# Patient Record
Sex: MALE | Race: WHITE | NOT HISPANIC OR LATINO | Employment: UNEMPLOYED | URBAN - METROPOLITAN AREA
[De-identification: names, ages, dates, MRNs, and addresses within clinical notes are randomized per-mention and may not be internally consistent; named-entity substitution may affect disease eponyms.]

---

## 2020-03-04 ENCOUNTER — HOSPITAL ENCOUNTER (OUTPATIENT)
Dept: MEDSURG UNIT | Facility: HOSPITAL | Age: 35
End: 2020-03-06
Attending: INTERNAL MEDICINE | Admitting: INTERNAL MEDICINE

## 2020-03-10 LAB
FINAL CULTURE: NORMAL
FINAL CULTURE: NORMAL

## 2022-04-29 NOTE — ED PROVIDER NOTES
Patient:   Michael Pickard             MRN: [id number]            FIN: [id number]               Age:   34 years     Sex:  Male     :  1985   Associated Diagnoses:   Hypomagnesemia; Hypokalemia; Methamphetamine abuse   Author:   Rodrick Masters MD      Basic Information   Time seen: Date & time 3/4/2020 20:30:00.   History source: Patient.   Arrival mode: Private vehicle.   History limitation: None.   Additional information: Chief Complaint from Nursing Triage Note : Chief Complaint   3/4/2020 20:25 CST       Chief Complaint           CHEST PAIN FOR  ABOUT  1  WEEK.  PATIENT  SAYS  HE  MIHGT HAVE  KIDNEY STONES.  ALSO C/O SOB. DOES  SMOKE  MARJUANA  AND  DOES  METHAMPHETAMINES.  SAYS  LAST  DID  METH  WAS  2  DAYS  AGO  .   Provider/Visit info:   Time Seen:  Rodrick Masters MD / 2020 20:30  .   History of Present Illness   The patient presents with chest pain.  The onset was 1  weeks ago.  The course/duration of symptoms is constant.  Location: substernal. Radiating pain: none. The character of symptoms is tightness and pressure.  The degree at onset was moderate.  The degree at maximum was severe.  The degree at present is moderate.  The exacerbating factor is none.  The relieving factor is none.  Risk factors consist of methamphetamine abuse.  Associated symptoms: shortness of breath, anxiety and palpitations.      34-year-old gentleman with a history of methamphetamine abuse presents emergency room with complaints of chest pain.  This pain has been on going for the last week.  Patient was recently seen in another facility, and left AGAINST MEDICAL ADVICE.  History of rhabdomyolysis.  Patient reports increasing shortness of breath over the last 2 days and worsening chest pain.  Reports last methamphetamine use 2 days prior.  Patient does admit to using marijuana as well, and drinking a significant amount of alcohol over the last 2 days..        Review of Systems   Constitutional  symptoms:  No fever, no chills.    Respiratory symptoms:  Shortness of breath.   Cardiovascular symptoms:  Chest pain.   Gastrointestinal symptoms:  No abdominal pain, no nausea, no vomiting.    Psychiatric symptoms:  Anxiety.             Additional review of systems information: All other systems reviewed and otherwise negative.      Health Status   Allergies:    Allergic Reactions (Selected)  No Known Medication Allergies,    Allergies (1) Active Reaction  No Known Medication Allergies None Documented  .   Medications:  (Selected)   Inpatient Medications  Ordered  IVF Normal Saline NS Bolus 1000ml: 1,000 mL, 1,000 mL, IV, 1,000 mL/hr, start date 03/04/20 20:46:00 CST, stop date 03/04/20 20:46:00 CST, STAT.      Past Medical/ Family/ Social History   Medical history:    No active or resolved past medical history items have been selected or recorded., Reviewed as documented in chart.   Surgical history:    hernia repair.  hernia repair., Reviewed as documented in chart.   Family history:    History is unknown., Reviewed as documented in chart.   Social history:    Social & Psychosocial Habits    Alcohol  08/07/2015  Use: Current    Frequency: 1-2 times per week    08/07/2015 Risk Assessment: Denies Alcohol Use    Employment/School  01/31/2017  Status: Unemployed    Home/Environment  04/06/2019  Lives with: Alone    Living situation: Home/Independent    Alcohol abuse in household: No    Substance abuse in household: No    Smoker in household: No    Injuries/Abuse/Neglect in household: No    Feels unsafe at home: No    Safe place to go: Yes    Agency(s)/Others notified: No    Family/Friends available to help: Yes    Concern for family members at home: No    Major illness in household: No    Financial concerns: No    Concerns over TV/Computer/Game use: No    Nutrition/Health  04/06/2019  Type of diet: Regular    Wants to lose weight: No    Sleeping concerns: Yes    Feels highly stressed: No    Sexual  04/06/2019   Sexually active: Yes    First active at age: 11 Years    Current partners: 0    Number of lifetime partners: 50    Do you think of your sexual orientation as: Straight or heterosexual    Uses condoms: No    History of sexual abuse: No    What is your current gender identity? (Check all that apply) Identifies as male    Substance Use  08/07/2015 Risk Assessment: Denies Substance Abuse    10/16/2015  Use: Never    04/05/2019  Use: Current    Type: Methamphetamines, Prescription medications    Comment: Saboxone - 04/05/2019 20:33 - Vicente BELTRAN, Adelita SCHWAB    Tobacco  08/07/2015 Risk Assessment: High Risk    03/04/2020  Use: 10 or more cigarettes (1/    Patient Wants Consult For Cessation Counseling No    Abuse/Neglect  03/04/2020  SHX Any signs of abuse or neglect No  , Reviewed as documented in chart.   Problem list:    Active Problems (4)  Acute hepatitis B   Deficient knowledge   Knowledge deficit   Tobacco user   .      Physical Examination               Vital Signs   Vital Signs   3/4/2020 20:25 CST       Temperature Oral          36.7 DegC                             Temperature Oral (calculated)             98.06 DegF                             Peripheral Pulse Rate     144 bpm  HI                             Respiratory Rate          20 br/min                             SpO2                      94 %                             Oxygen Therapy            Room air                             Systolic Blood Pressure   160 mmHg  HI                             Diastolic Blood Pressure  126 mmHg  HI  .      Vital Signs (last 24 hrs)_____  Last Charted___________  Temp Oral     36.7 DegC  (MAR 04 20:25)  Heart Rate Peripheral   H 144bpm  (MAR 04 20:25)  Resp Rate         20 br/min  (MAR 04 20:25)  SBP      H 160mmHg  (MAR 04 20:25)  DBP      H 126mmHg  (MAR 04 20:25)  SpO2      94 %  (MAR 04 20:25)  .   Measurements   3/4/2020 20:25 CST       Weight Dosing             76.75 kg                             Weight Measured  and Calculated in Lbs     169.20 lb                             Weight Estimated          76.75 kg                             Height/Length Dosing      185.45 cm                             Height/Length Estimated   185.45 cm                             Body Mass Index Estimated 22.32 kg/m2  .   Basic Oxygen Information   3/4/2020 20:25 CST       SpO2                      94 %                             Oxygen Therapy            Room air  .   General:  Alert, no acute distress, anxious.    Skin:  Intact, moist.    Head:  Normocephalic, atraumatic.    Eye:  Pupils are equal, round and reactive to light, extraocular movements are intact, normal conjunctiva.    Ears, nose, mouth and throat:  Oral mucosa moist.   Cardiovascular:  No murmur, Normal peripheral perfusion, No edema, Tachycardia.    Respiratory:  Breath sounds are equal, Symmetrical chest wall expansion, Respirations: Tachypneic, Breath sounds: Bilateral, base(s), diminished.    Gastrointestinal:  Soft, Nontender, Non distended, Normal bowel sounds.    Neurological:  Alert and oriented to person, place, time, and situation, No focal neurological deficit observed.    Psychiatric:  Cooperative, appropriate mood & affect.       Medical Decision Making   Differential Diagnosis:  Atypical chest pain, Methamphetamine intoxication, rhabdomyolysis.    Documents reviewed:  Emergency department nurses' notes.   Electrocardiogram:  Time 3/4/2020 20:25:00, rate 143, no ectopy, normal CA & QRS intervals, EP Interp, The Rhythm is sinus tachycardia.  , STT segments Non specific changes.    Results review:  Lab results : Lab View   3/4/2020 22:24 CST       U pH                      5.5                             UA Appear                 Clear                             UA Color                  YELLOW                             UA Spec Grav              1.026                             UA Bili                   Negative                             UA pH                      5.5                             UA Urobilinogen           2 mg/dL                             UA Blood                  0.06 mg/dL                             UA Glucose                30 mg/dL                             UA Ketones                Trace                             UA Protein                100 mg/dL                             UA Nitrite                Negative                             UA Leuk Est               Negative                             UA WBC Interp             6-10 /HPF                             UA RBC Interp             0-2 /HPF                             UA Mucous                 Trace                             UA Bact Interp            None Seen /HPF                             UA Squam Epi Interp        /LPF                             UA Coarse Gran            Rare                             UA Hyal Cast Interp       6-10 /LPF                             U Temp                    23.0 DegC                             U Amph Scr                Positive                             U Diana Scr                Negative                             U Benzodia Scr            Negative                             U Cannab Scr              Positive                             U Cocaine Scr             Negative                             U Opiate Scr              Negative                             U Phencyclidine Scr       Negative    3/4/2020 21:45 CST       POC Troponin              0.03 ng/mL    3/4/2020 21:35 CST       Sodium Lvl                145 mmol/L                             Potassium Lvl             2.2 mmol/L  CRIT                             Chloride                  118 mmol/L  HI                             CO2                       22 mmol/L                             Calcium Lvl               5.9 mg/dL  CRIT                             Magnesium Lvl             1.5 mg/dL  LOW                             Glucose Lvl               103 mg/dL                              BUN                       20 mg/dL  HI                             Creatinine                0.70 mg/dL                             eGFR-AA                   >105 mL/min                             eGFR-FROILAN                  >105 mL/min                             Bili Total                0.5 mg/dL                             Bili Direct               0.2 mg/dL                             Bili Indirect             0.3 mg/dL                             AST                       54 unit/L  HI                             ALT                       68 unit/L                             Alk Phos                  40 unit/L  LOW                             Total Protein             5.4 gm/dL  LOW                             Albumin Lvl               2.6 gm/dL  LOW                             Globulin                  2.80 gm/mL                             A/G Ratio                 0.9 ratio  LOW                             Total CK                  1,003 unit/L  HI                             CK MB                     3.5 ng/mL                             WBC                       13.0 x10(3)/mcL  HI                             RBC                       3.52 x10(6)/mcL  LOW                             Hgb                       10.9 gm/dL  LOW                             Hct                       32.4 %  LOW                             Platelet                  126 x10(3)/mcL  LOW                             MCV                       92.0 fL                             MCH                       31.0 pg                             MCHC                      33.6 gm/dL                             RDW                       13.5 %                             MPV                       10.9 fL  HI                             Abs Neut                  11.00 x10(3)/mcL  HI                             Segs Man                  95 %  HI                             Band Man                  1 %                              Lymph Man                 2.0 %  LOW                             Monocyte Man              2 %                             Eos Man                   0 %                             Basophil Man              0 %                             Platelet Est              Normal                             RBC Morph                 Normal  .   Chest X-Ray:  Time reported 3/4/2020 22:30:00, no acute disease process, interpretation by Emergency Physician.       Reexamination/ Reevaluation   Time: 3/5/2020 23:00:00 .   Notes: Patient has continued tachycardia - hypomagnesemia, hypocalcemia, hypokalemia.  Patient feeling improved with administration of Ativan, with BP and HR improvement.  Electrolyte changes may also be secondary to shifting from tachypnea/hyperventilating.  No specific source of infection for leukocytosis - possibly demargination.  Due to IV meth abuse, will obtain blood cultures to eval for potential endocarditis, though no murmur appreciated on auscultation.  IM has been consulted for admission for monitoring and electrolyte replacement..      Procedure   Procedure note   Time: 3/4/2020 23:05:00 .    Procedure: Supraperiosteal Nerve Block Dental Injection 3/4/2020 23:05:53  Risks/Benefits discussed with the patient.  A 27 gauge needle used to instill 0.5 mL of Marcaine 0.5% with epi at the periostial region of tooth #15.  Patient tolerated well.  No complications.  Complete resolution of the pain..       Impression and Plan   Diagnosis   Hypomagnesemia (SVF88-WU E83.42)   Hypokalemia (ZVX51-QU E87.6)   Methamphetamine abuse (ITO93-YM F15.10)      Calls-Consults   -  3/4/2020 23:11:00 , Internal Medicine, consult.    Plan   Disposition: Discharged: time  3/4/2020 23:11:00.    Counseled: Patient, Regarding diagnosis, Regarding diagnostic results, Regarding treatment plan, Patient indicated understanding of instructions.

## 2022-04-29 NOTE — CONSULTS
"   Patient:   Michael Pickard             MRN: [id number]            FIN: [id number]               Age:   34 years     Sex:  Male     :  1985   Associated Diagnoses:   None   Author:   Carisa Vaca      Reason for Consult: "Psych Evaluation"    Chief Complaint: CHEST PAIN FOR ABOUT 1 WEEK. PATIENT SAYS HE MIHGT HAVE KIDNEY STONES. ALSO C/O SOB. DOES SMOKE MARJUANA AND DOES METHAMPHETAMINES. SAYS LAST DID METH WAS 2 DAYS AGO   History of Present Illness: Patient is a 34 year old WM with a history of polysubstance abuse, EtOH abuse, tobacco abuse, bipolar disorder and history of hepatitis C? (untreated) who presents to the ER c/o chest pain. Patient reports that he started having chest pain around 2 weeks ago. Location is diffuse and all over the chest. Patient describes it as a 8/10, more of a discomfort kind of pain. Intermittent in nature, happens 1x/day. Lasts about 30 minutes an episode. Patient thinks it's panic attack/HTN/anxiety related chest pain. There is associated palpitation, nausea, dizziness, some SOB, and weakness. Otherwise denies any other active symptoms including abdominal pain, constipation/diarrhea, skin rash, LE swelling, headache, seizures, hallucinations.    Patient is concerned about having chronic testicle pain has been going on for 2-3 years. Patient reports it gets worse with exertion. Not having any acute episode at this time.    In the ER, patients vitals were remarkable for  and /126. Labs were remarkable for Potassium 2.2, Chloride 118, Calcium 5.9, Magnesium 1.5, Albumin 2.6, CK 1003, WBC 13, H/H 10.9/32.4 with MCV 92. UA was a dirty catch. UDS positive for amphetamine and cannabinoid. EKG is ordered and pending. CXR read is pending. Patient is being admitted for severe electrolyte abnormalities.    Psychiatric Evaluation:      Patient states, "Different things. I have Hepatitis B and C since I was 16years old..." Patient fell asleep during the " interview.     Girlfriend is at the bedside and states that he is trying to get off Meth. Both patient and his girlfriend report that because he was coming off of meth, he used Suboxone yesterday but denies using it on a regular basis recently. She states that the Meth turns him into a paranoid person; he acts different. Thinks everyone is trying to hurt him or kill him. States that he is paranoid on a normal basis but more so when he is using Meth. States that he was doing better when he was on his medication.     **Because patient was sedated on the evening of 3/5/2020 when the Psychiatric Evaluation was initiated, this provider returned on 3/6/2020 to complete the evaluation. Patient is lying in bed sleeping. He will arouse easily enough but is not able to stay awake to answer questions. He remains sedated. Was able to say that he does not have SI. Because of his sedation, some of his history is incomplete. Nevertheless, it is clear that patient has a history of SA and Bipolar Disorder. Has been on medication in the past that has worked well for him. Also, he is detoxing from Alcohol. The detox protocol should be completed and/or shortened prior to discharge. If the protocol is stopped abruptly, he may be at risk for seizures.      Psychiatric History: History of Bipolar I Disorder. History of suicide attempts. History of psychiatric hospitalization. Was last hospitalized April 2019. Has been prescribed Seroquel and Trileptal in the past.      Substance Use History:  UDS+ for Amphetamines and Cannabis. Has a history of Amphetamine, Cannabis, and Opiate use.   Alcohol - Drinks vodka 3 x 1/2 pint or more a day; last drink was 3/3  Amphetamines - Meth twice a week; amount unknown. Last use 2 days ago  Benzodiazepines - denies  Cocaine - denies  Opiates - reports using Suboxone recently  Marijuana - smokes a cigar daily     Social History: Single. Never . has 6 kids. none of them live with him. He lives in a  trailer with his girlfriend and her two children. Unemployed. Dropped out of the 10th or the 11th grade.      Trauma History: MIREYA     Legal History: Has been in and out of custodial since he was 16. Recently off of parole. He is a registered sex offender. Just got off of      Family History:  Mother - MIREYA  Father - MIREYA     Medical History:  Hepatitis B and C    Surgical History:      see H&P     Diagnosis:  1. Alcohol Dependence  2. Alcohol withdrawal  3. Methamphetamine abuse  4. Bipolar 1 Disorder    Impression/Recommendation:  1. Alcohol withdrawal - Has been started on a HD3D Librium detox protocol but also has PRN Ativan and has been getting both Ativan and Librium. Patient is sedated. Consider discontinuing the PRN Ativan and completing the Librium detox protocol. The protocol is scheduled to end on 3/7/2020 at 1630. Might consider giving the last dose of Librium at 3/7/2020 at 0430 and assess for any s/s of withdrawal at 1630. If no withdrawal symptoms and not sedated, he would be safe for discharge.   2. Bipolar Disorder - Consider restarting Trileptal 150mg BID; Seroquel 50-100mg q HS; and Vistaril 50mg TID PRN.    Thank you for the consult. Please feel free to contact me for any further questions or concerns.    Time spent with patient  = one hour

## 2022-05-02 NOTE — HISTORICAL [facility]
This is a historical note converted from [place]. Formatting and pictures may have been removed.  Please reference Rach for original formatting and attached multimedia. Chief Complaint  CHEST PAIN FOR ?ABOUT ?1 ?WEEK. ?PATIENT ?SAYS ?HE ?MIHGT HAVE ?KIDNEY STONES. ?ALSO C/O SOB. DOES ?SMOKE ?MARJUANA ?AND ?DOES ?METHAMPHETAMINES. ?SAYS ?LAST ?DID ?METH ?WAS ?2 ?DAYS ?AGO  History of Present Illness  ?  Patient is a 34 year old WM with a history of polysubstance abuse, EtOH abuse, tobacco abuse,?bipolar disorder and?history of hepatitis C? (untreated) who presents to the ER c/o chest pain. Patient reports that he started having chest pain around 2 weeks ago. Location is diffuse and all over the chest. Patient describes it as a 8/10, more of a discomfort kind of pain. Intermittent in nature, happens 1x/day. Lasts about 30 minutes an episode. Patient thinks its panic attack/HTN/anxiety related chest pain. There is associated palpitation, nausea, dizziness, some SOB, and weakness. Otherwise denies any other active symptoms including abdominal pain, constipation/diarrhea, skin rash, LE swelling, headache, seizures, hallucinations.  ?   Patient is concerned about having chronic testicle pain has been going on for 2-3 years. Patient reports it gets worse with exertion. Not having any acute episode at this time.  ?  In the ER, patient?s vitals were remarkable for  and /126. Labs were remarkable for Potassium 2.2, Chloride 118, Calcium 5.9, Magnesium 1.5, Albumin 2.6, CK 1003, WBC 13, H/H 10.9/32.4 with MCV 92. UA was a dirty catch. UDS positive for amphetamine and cannabinoid. EKG is ordered and pending. CXR read is pending. Patient is being admitted for severe electrolyte abnormalities.  ?  PMHx - HTN?, bipolar disorder, hepatitis C (untreated)  Sx - hernia surgery  Family hx - unknown cancer, parents with high blood pressure  Allergies - NKDA  Medications - Reports he was taking Trileptal, Seroquel,  hydroxyzine at home but didnt bring any meds?and no records  Social hx - 1 pack/day tobacco use for ~20 years, daily liquor (half a gallon) for past few weeks, Last meth (ate it) 2 days ago, last crack cocaine use 1 year ago, reports having been an amphetamine addict (stopped 2 days ago), reports distant history of IV drug use  Review of Systems  Constitutional: + Weakness/fatigue. No weight loss, fever, chills, night sweats, malaise, lethargy.  Respiratory: + SOB. No cough or sputum.  Cardiovascular: + Palpitations/chest discomfort. No chest pain, chest pressure. No?edema.  Gastrointestinal: + Nausea. No anorexia, vomiting or diarrhea. No abdominal pain or blood.  Genitourinary: + Testicle pain. No dysuria, frequency or urgency.  Hematologic/Lymph: No anemia, bleeding or bruising.  Immunologic: No pruritus?or?swelling.  Musculoskeletal: No muscle, joint swelling,?back pain, joint pain or stiffness.  Integumentary: No rash or itching.  Neurologic: No headache, dizziness, syncope, paralysis, ataxia, numbness or tingling in the extremities. No change in bowel or bladder control.  Physical Exam  Vitals & Measurements  T:?36.7? ?C (Oral)? HR:?113(Peripheral)? RR:?17? BP:?165/101? SpO2:?98%? WT:?76.75?kg?  General: Alert and oriented x 3, in no acute distress.  HEENT: Normocephalic, atraumatic.  Neck: Supple. No lymphadenopathy or thyromegaly.  Chest: Clear to auscultate bilaterally. No wheezing or?rhonchi.  Heart: Tachycardic. Regular. Hard to appreciated any murmurs.  Abdomen: Soft, nontender, and nondistended. Normal active bowel sounds.  Extremities: No cyanosis, clubbing or edema.  Neurologic: No focal deficit. No sensory deficit.  Integumentary: Moist mucous membranes.?Good skin turgor, intact.  Assessment/Plan  ?  Critical hypokalemia  Hypomagnesemia  Hypocalcemia  -Potassium 2.2, Calcium 5.9, Magnesium 1.5 on admission  -Suspecting related to social history  -Aggressively supplementing  -Checking labs as  appropriate?  -EKG ordered and is pending  ?   Atypical chest pain vs panic disorder  -Likely 2/2 social history  -Will draw 1 more troponin  -EKG ordered and is pending  ?   Elevated CK  -CK 1003 today  -IVFs  -Monitor  ?  Leukocytosis  -Likely?reactive  -Monitor for fever  -Hx of IV drug use - be ware of possible endocarditis but low suspicion at this time  -ER kamila BC x 2  ?  Normocytic anemia  -Likely 2/2 nutritional deficiency  -Will order folate/B12 levels  -Giving some Folic acid in light of EtOH abuse  ?  Testicular pain  -Will order testicular US  ?  History of Hepatitis??-?untreated  -Will order hepatitis panel and HIV  -Order viral load if Hep C ab is positive  -If positive, refer to ID  ?  Severe EtOH abuse  Tobacco abuse  Bipolar disorder  -CIWA protocol initiated  -Librium protocol initiated  -Repleting folic acid and thiamine  -Questionable home meds - nothing on Dr. First  -Educate about cessation  ?  DVT Prophylaxis with Lovenox  ?  Disposition: Looking for stabilization of electrolytes/CK. Then patient will need a very close outpatient PCP and Psyc follow up. F/u with studies ordered.   Problem List/Past Medical History  Ongoing  Acute hepatitis B  Deficient knowledge  Historical  No qualifying data  Procedure/Surgical History  Drainage of Left Lower Arm Skin, External Approach (12/15/2017)  Incision and drainage of abscess (eg, carbuncle, suppurative hidradenitis, cutaneous or subcutaneous abscess, cyst, furuncle, or paronychia); complicated or multiple (12/15/2017)  hernia repair  hernia repair   Medications  Inpatient  chlordiazePOXIDE, 25 mg= 1 cap(s), Oral, q2hr, PRN  folic acid 1 mg oral tablet, 1 mg= 1 tab(s), Oral, Daily  Lactated Ringers 1000ml 1,000 mL, 1000 mL, IV  Lactated Ringers 1000ml 125 mL, 125 mL, IV  Librium, 50 mg= 2 cap(s), Oral, Once  Librium, 50 mg= 2 cap(s), Oral, q4hr  Librium, 50 mg= 2 cap(s), Oral, q8hr  Librium, 25 mg= 1 cap(s), Oral, q12hr  LORAzepam, 1 mg= 0.5 mL, IV  Push, q2hr, PRN  LORAzepam, 2 mg= 1 mL, IV Push, q1hr, PRN  Lovenox, 40 mg= 0.4 mL, Subcutaneous, Daily  Magnesium Sulfate 2gm/50ml IVPB (Premix), 2 gm= 50 mL, IV Piggyback, q30min  nicotine 21 mg/24 hr transdermal film, extended release, 21 mg= 1 patch(es), TD, Daily  potassium chloride 10 mEq/100 mL intravenous solution, 10 mEq= 100 mL, IV Piggyback, q1hr  thiamine, 100 mg= 1 mL, Oral, Daily  Home  No active home medications  Allergies  No Known Medication Allergies  Social History  Abuse/Neglect  No, 03/04/2020  Alcohol - Denies Alcohol Use, 08/07/2015  Current, 1-2 times per week, 08/07/2015  Employment/School  Unemployed, 01/31/2017  Home/Environment  Lives with Alone. Living situation: Home/Independent. Alcohol abuse in household: No. Substance abuse in household: No. Smoker in household: No. Injuries/Abuse/Neglect in household: No. Feels unsafe at home: No. Safe place to go: Yes. Agency(s)/Others notified: No. Family/Friends available for support: Yes. Concern for family members at home: No. Major illness in household: No. Financial concerns: No. TV/Computer concerns: No., 04/06/2019  Nutrition/Health  Regular, Wants to lose weight: No. Sleeping concerns: Yes. Feels highly stressed: No., 04/06/2019  Sexual  Sexually active: Yes. Sexually active at age 11 Years. Number of current partners 0. Number of lifetime partners 50. Sexual orientation: Straight or heterosexual. Uses condoms: No. History of sexual abuse: No. Gender Identity Identifies as male., 04/06/2019  Substance Use - Denies Substance Abuse, 08/07/2015  Current, Methamphetamines, Prescription medications, 04/05/2019  Never, 10/16/2015  Tobacco - High Risk, 08/07/2015  10 or more cigarettes (1/2 pack or more)/day in last 30 days, No, 03/04/2020  Family History  Family history is unknown  Lab Results  Labs Last 24 Hours?  ?Chemistry? Hematology/Coagulation?   Sodium Lvl: 145 mmol/L (03/04/20 21:35:00) WBC:?13 x10(3)/mcL?High (03/04/20 21:35:00)    Potassium Lvl:?2.2 mmol/L?Critical (03/04/20 21:35:00) RBC:?3.52 x10(6)/mcL?Low (03/04/20 21:35:00)   Chloride:?118 mmol/L?High (03/04/20 21:35:00) Hgb:?10.9 gm/dL?Low (03/04/20 21:35:00)   CO2: 22 mmol/L (03/04/20 21:35:00) Hct:?32.4 %?Low (03/04/20 21:35:00)   Calcium Lvl:?5.9 mg/dL?Critical (03/04/20 21:35:00) Platelet:?126 x10(3)/mcL?Low (03/04/20 21:35:00)   Magnesium Lvl:?1.5 mg/dL?Low (03/04/20 21:35:00) MCV: 92 fL (03/04/20 21:35:00)   Glucose Lvl: 103 mg/dL (03/04/20 21:35:00) MCH: 31 pg (03/04/20 21:35:00)   BUN:?20 mg/dL?High (03/04/20 21:35:00) MCHC: 33.6 gm/dL (03/04/20 21:35:00)   Creatinine: 0.7 mg/dL (03/04/20 21:35:00) RDW: 13.5 % (03/04/20 21:35:00)   eGFR-AA: >105 (03/04/20 21:35:00) MPV:?10.9 fL?High (03/04/20 21:35:00)   eGFR-FROILAN: >105 (03/04/20 21:35:00) Abs Neut:?11 x10(3)/mcL?High (03/04/20 21:35:00)   Bili Total: 0.5 mg/dL (03/04/20 21:35:00) Segs Man:?95 %?High (03/04/20 21:35:00)   Bili Direct: 0.2 mg/dL (03/04/20 21:35:00) Band Man: 1 % (03/04/20 21:35:00)   Bili Indirect: 0.3 mg/dL (03/04/20 21:35:00) Lymph Man:?2 %?Low (03/04/20 21:35:00)   AST:?54 unit/L?High (03/04/20 21:35:00) Monocyte Man: 2 % (03/04/20 21:35:00)   ALT: 68 unit/L (03/04/20 21:35:00) Eos Man: 0 % (03/04/20 21:35:00)   Alk Phos:?40 unit/L?Low (03/04/20 21:35:00) Basophil Man: 0 % (03/04/20 21:35:00)   Total Protein:?5.4 gm/dL?Low (03/04/20 21:35:00) Platelet Est: Normal (03/04/20 21:35:00)   Albumin Lvl:?2.6 gm/dL?Low (03/04/20 21:35:00) RBC Morph: Normal (03/04/20 21:35:00)   Globulin: 2.8 gm/mL (03/04/20 21:35:00)    A/G Ratio:?0.9 ratio?Low (03/04/20 21:35:00)    Total CK:?1003 unit/L?High (03/04/20 21:35:00)    CK MB: 3.5 ng/mL (03/04/20 21:35:00)    POC Troponin: 0.03 ng/mL (03/04/20 21:45:00)    U pH: 5.5 (03/04/20 22:24:00)        34-year-old?white male with past medical history of polysubstance abuse, alcohol abuse, tobacco abuse, bipolar disorder?possible history of hepatitis C, IV drug?abuse in the  past?presented to ER with complaints of chest pain?and noted to have electrolyte abnormalities?with potassium of 2.2, calcium 5.9, magnesium 1.5?and medicine was consulted?for electrolyte abnormality, to rule out ACS.? This morning?went to examine patient?twice?but both times patient?wanted to sleep, was arousable?answer few questions?but said he was sleepy?and did not answer much?denied any?chest pain, fever chills and shortness of breath currently.? His electrolyte abnormalities?could be likely secondary to?chronic malnutrition?with alcohol abuse, has elevated CK?which trended up today.? Electrolytes have been replaced.? Troponins have been negative, EKG?resulted sinus tachycardia, ordered repeat troponin and EKG.? Continue LR at 125 cc/h?and monitor CK level.? Avoid nephrotoxic drugs.? Patients UDS positive for Amphetamine and cannabis, has history of alcohol abuse, no signs of?DT currently. ?Will continue CIWA protocol, folic acid, thiamine.? And has possible history of hepatitis, hepatitis panel, HIV ordered, ultrasound abdomen ordered?if positive?will refer to ID clinic.? With a history of IV drug abuse?and trending?white count?will discuss with?on getting echo to rule out?endocarditis.? Case management consulted?rehab placement?and psych evaluation.? We will continue to monitor?and follow-up with?above work-up.   Patient seen and examined this am in the ED.? Agree with assessment and management plan as documented. Proceed with Echo.

## 2022-11-14 ENCOUNTER — HOSPITAL ENCOUNTER (OUTPATIENT)
Facility: HOSPITAL | Age: 37
Discharge: HOME | End: 2022-11-16
Attending: HOSPITALIST | Admitting: HOSPITALIST
Payer: MEDICAID

## 2022-11-14 DIAGNOSIS — T14.8XXA WOUND INFECTION: ICD-10-CM

## 2022-11-14 DIAGNOSIS — M79.672 LEFT FOOT PAIN: Primary | ICD-10-CM

## 2022-11-14 DIAGNOSIS — L08.9 WOUND INFECTION: ICD-10-CM

## 2022-11-14 LAB
ALBUMIN SERPL-MCNC: 4 GM/DL (ref 3.5–5)
ALBUMIN/GLOB SERPL: 1.3 RATIO (ref 1.1–2)
ALP SERPL-CCNC: 87 UNIT/L (ref 40–150)
ALT SERPL-CCNC: 68 UNIT/L (ref 0–55)
AST SERPL-CCNC: 42 UNIT/L (ref 5–34)
BASOPHILS # BLD AUTO: 0.06 X10(3)/MCL (ref 0–0.2)
BASOPHILS NFR BLD AUTO: 0.7 %
BILIRUBIN DIRECT+TOT PNL SERPL-MCNC: 0.3 MG/DL
BUN SERPL-MCNC: 14 MG/DL (ref 8.9–20.6)
CALCIUM SERPL-MCNC: 9.4 MG/DL (ref 8.4–10.2)
CHLORIDE SERPL-SCNC: 107 MMOL/L (ref 98–107)
CO2 SERPL-SCNC: 25 MMOL/L (ref 22–29)
CREAT SERPL-MCNC: 0.73 MG/DL (ref 0.73–1.18)
EOSINOPHIL # BLD AUTO: 0.23 X10(3)/MCL (ref 0–0.9)
EOSINOPHIL NFR BLD AUTO: 2.8 %
ERYTHROCYTE [DISTWIDTH] IN BLOOD BY AUTOMATED COUNT: 13.6 % (ref 11.5–17)
GFR SERPLBLD CREATININE-BSD FMLA CKD-EPI: >60 MLS/MIN/1.73/M2
GLOBULIN SER-MCNC: 3 GM/DL (ref 2.4–3.5)
GLUCOSE SERPL-MCNC: 101 MG/DL (ref 74–100)
HCT VFR BLD AUTO: 38.4 % (ref 42–52)
HGB BLD-MCNC: 13.3 GM/DL (ref 14–18)
IMM GRANULOCYTES # BLD AUTO: 0.07 X10(3)/MCL (ref 0–0.04)
IMM GRANULOCYTES NFR BLD AUTO: 0.9 %
LACTATE SERPL-SCNC: 1.4 MMOL/L (ref 0.5–2.2)
LYMPHOCYTES # BLD AUTO: 2.08 X10(3)/MCL (ref 0.6–4.6)
LYMPHOCYTES NFR BLD AUTO: 25.3 %
MCH RBC QN AUTO: 30.1 PG (ref 27–31)
MCHC RBC AUTO-ENTMCNC: 34.6 MG/DL (ref 33–36)
MCV RBC AUTO: 86.9 FL (ref 80–94)
MONOCYTES # BLD AUTO: 0.81 X10(3)/MCL (ref 0.1–1.3)
MONOCYTES NFR BLD AUTO: 9.9 %
NEUTROPHILS # BLD AUTO: 5 X10(3)/MCL (ref 2.1–9.2)
NEUTROPHILS NFR BLD AUTO: 60.4 %
NRBC BLD AUTO-RTO: 0 %
PLATELET # BLD AUTO: 240 X10(3)/MCL (ref 130–400)
PMV BLD AUTO: 10.6 FL (ref 7.4–10.4)
POTASSIUM SERPL-SCNC: 3.9 MMOL/L (ref 3.5–5.1)
PROT SERPL-MCNC: 7 GM/DL (ref 6.4–8.3)
RBC # BLD AUTO: 4.42 X10(6)/MCL (ref 4.7–6.1)
SODIUM SERPL-SCNC: 140 MMOL/L (ref 136–145)
WBC # SPEC AUTO: 8.2 X10(3)/MCL (ref 4.5–11.5)

## 2022-11-14 PROCEDURE — 87040 BLOOD CULTURE FOR BACTERIA: CPT | Performed by: NURSE PRACTITIONER

## 2022-11-14 PROCEDURE — 25500020 PHARM REV CODE 255

## 2022-11-14 PROCEDURE — G0378 HOSPITAL OBSERVATION PER HR: HCPCS

## 2022-11-14 PROCEDURE — 96365 THER/PROPH/DIAG IV INF INIT: CPT

## 2022-11-14 PROCEDURE — 80053 COMPREHEN METABOLIC PANEL: CPT | Performed by: NURSE PRACTITIONER

## 2022-11-14 PROCEDURE — 85025 COMPLETE CBC W/AUTO DIFF WBC: CPT | Performed by: NURSE PRACTITIONER

## 2022-11-14 PROCEDURE — 25000003 PHARM REV CODE 250: Performed by: NURSE PRACTITIONER

## 2022-11-14 PROCEDURE — 11000001 HC ACUTE MED/SURG PRIVATE ROOM

## 2022-11-14 PROCEDURE — 63600175 PHARM REV CODE 636 W HCPCS

## 2022-11-14 PROCEDURE — 25000003 PHARM REV CODE 250

## 2022-11-14 PROCEDURE — 83605 ASSAY OF LACTIC ACID: CPT | Performed by: NURSE PRACTITIONER

## 2022-11-14 PROCEDURE — 96375 TX/PRO/DX INJ NEW DRUG ADDON: CPT

## 2022-11-14 PROCEDURE — 99285 EMERGENCY DEPT VISIT HI MDM: CPT | Mod: 25

## 2022-11-14 RX ORDER — ACETAMINOPHEN 325 MG/1
650 TABLET ORAL EVERY 4 HOURS PRN
Status: DISCONTINUED | OUTPATIENT
Start: 2022-11-14 | End: 2022-11-16 | Stop reason: HOSPADM

## 2022-11-14 RX ORDER — IBUPROFEN 200 MG
16 TABLET ORAL
Status: DISCONTINUED | OUTPATIENT
Start: 2022-11-14 | End: 2022-11-16 | Stop reason: HOSPADM

## 2022-11-14 RX ORDER — ACETAMINOPHEN 325 MG/1
650 TABLET ORAL EVERY 8 HOURS PRN
Status: DISCONTINUED | OUTPATIENT
Start: 2022-11-14 | End: 2022-11-16 | Stop reason: HOSPADM

## 2022-11-14 RX ORDER — GLUCAGON 1 MG
1 KIT INJECTION
Status: DISCONTINUED | OUTPATIENT
Start: 2022-11-14 | End: 2022-11-16 | Stop reason: HOSPADM

## 2022-11-14 RX ORDER — KETOROLAC TROMETHAMINE 30 MG/ML
30 INJECTION, SOLUTION INTRAMUSCULAR; INTRAVENOUS
Status: COMPLETED | OUTPATIENT
Start: 2022-11-14 | End: 2022-11-14

## 2022-11-14 RX ORDER — TRAMADOL HYDROCHLORIDE 50 MG/1
50 TABLET ORAL EVERY 6 HOURS PRN
Status: DISCONTINUED | OUTPATIENT
Start: 2022-11-14 | End: 2022-11-15

## 2022-11-14 RX ORDER — IBUPROFEN 200 MG
24 TABLET ORAL
Status: DISCONTINUED | OUTPATIENT
Start: 2022-11-14 | End: 2022-11-16 | Stop reason: HOSPADM

## 2022-11-14 RX ORDER — ONDANSETRON 2 MG/ML
4 INJECTION INTRAMUSCULAR; INTRAVENOUS EVERY 8 HOURS PRN
Status: DISCONTINUED | OUTPATIENT
Start: 2022-11-14 | End: 2022-11-16 | Stop reason: HOSPADM

## 2022-11-14 RX ADMIN — KETOROLAC TROMETHAMINE 30 MG: 30 INJECTION, SOLUTION INTRAMUSCULAR at 01:11

## 2022-11-14 RX ADMIN — PIPERACILLIN AND TAZOBACTAM 4.5 G: 4; .5 INJECTION, POWDER, LYOPHILIZED, FOR SOLUTION INTRAVENOUS; PARENTERAL at 01:11

## 2022-11-14 RX ADMIN — TRAMADOL HYDROCHLORIDE 50 MG: 50 TABLET, COATED ORAL at 06:11

## 2022-11-14 RX ADMIN — IOPAMIDOL 100 ML: 755 INJECTION, SOLUTION INTRAVENOUS at 02:11

## 2022-11-14 NOTE — ED PROVIDER NOTES
Encounter Date: 11/14/2022       History     Chief Complaint   Patient presents with    Foot Injury     Pt. C/o L foot pain, swelling, and redness x 1 week s/t stepped on a nail. Pt. Seen for same and taking Abx and given Tdap.      HPI  Review of patient's allergies indicates:  No Known Allergies  History reviewed. No pertinent past medical history.  Past Surgical History:   Procedure Laterality Date    HERNIA REPAIR       No family history on file.  Social History     Tobacco Use    Smoking status: Every Day     Packs/day: 1.00     Types: Cigarettes    Smokeless tobacco: Never   Substance Use Topics    Alcohol use: Never    Drug use: Not Currently     Types: Methamphetamines     Review of Systems    Physical Exam     Initial Vitals [11/14/22 0930]   BP Pulse Resp Temp SpO2   (!) 173/100 93 20 98.4 °F (36.9 °C) 97 %      MAP       --         Physical Exam    ED Course   Procedures  Labs Reviewed   CBC WITH DIFFERENTIAL - Abnormal; Notable for the following components:       Result Value    RBC 4.42 (*)     Hgb 13.3 (*)     Hct 38.4 (*)     MPV 10.6 (*)     IG# 0.07 (*)     All other components within normal limits   COMPREHENSIVE METABOLIC PANEL - Abnormal; Notable for the following components:    Glucose Level 101 (*)     Alanine Aminotransferase 68 (*)     Aspartate Aminotransferase 42 (*)     All other components within normal limits   LACTIC ACID, PLASMA - Normal          Imaging Results              CT Foot With Contrast Left (Final result)  Result time 11/14/22 14:38:25      Final result by Félix Perez MD (11/14/22 14:38:25)                   Impression:      Localized regional soft tissue edema lateral plantar aspect of the foot near the distal 5th metatarsal and metatarsal head.  No osseous abnormality seen.      Electronically signed by: Yenny Perez MD  Date:    11/14/2022  Time:    14:38               Narrative:    EXAMINATION:  CT FOOT WITH CONTRAST LEFT    CLINICAL HISTORY:  Foot swelling,  nondiabetic, osteomyelitis suspected;    TECHNIQUE:  CT left foot without contrast performed using routine protocol.  .  Automated exposure control used.    COMPARISON:  None    FINDINGS:  There is a localized region of soft tissue edema, thickening involving the lateral plantar aspect of the subcutaneous adipose tissue near the level of the 5th metatarsal head. The metatarsal demonstrates a normal appearance otherwise with no fracture or 0 sub change.  Remaining visualized bones of the foot and ankle are intact with no fracture or dislocation.  Visualized soft tissue structures including tendons and muscular tissue are normal.                                       X-Ray Foot Complete Left (Final result)  Result time 11/14/22 10:03:23      Final result by Rosalio Roth MD (11/14/22 10:03:23)                   Impression:      No acute osseous process appreciated.      Electronically signed by: Rosalio Roth  Date:    11/14/2022  Time:    10:03               Narrative:    EXAMINATION:  XR FOOT COMPLETE 3 VIEW LEFT    CLINICAL HISTORY:  Other injury of unspecified body region, initial encounter    TECHNIQUE:  AP, lateral and oblique views of the left foot    COMPARISON:  None    FINDINGS:  No acute fracture identified.  Joint alignments are maintained.  No tracking subcutaneous gas or focal sites of osteolysis.                                       Medications   piperacillin-tazobactam (ZOSYN) 4.5 g in dextrose 5 % in water (D5W) 5 % 100 mL IVPB (MB+) (4.5 g Intravenous New Bag 11/14/22 1345)   ketorolac injection 30 mg (30 mg Intravenous Given 11/14/22 1345)   iopamidoL (ISOVUE-370) injection 100 mL (100 mLs Intravenous Given 11/14/22 1406)                Attending Attestation:   Physician Attestation Statement for Resident:  As the supervising MD   Physician Attestation Statement: I have personally seen and examined this patient.   I agree with the above history.  -:   As the supervising MD I agree with  the above PE.     As the supervising MD I agree with the above treatment, course, plan, and disposition.   -: Patient 4-5 days after initial injury on antibiotics, elevating foot, not ambulating on it with no improvement.  Workup is not particularly remarkable, no signs of abscess on CT but will need IV antibiotics as failing to improve with oral treatment                 ED Course as of 11/14/22 1513   Mon Nov 14, 2022   1325 Discussed patient with Dr. Montgomery. Recommended Zosyn admin and CT of extremity.  [JA]      ED Course User Index  [JA] Jerilyn Harry NP                 Clinical Impression:   Final diagnoses:  [T14.8XXA, L08.9] Wound infection  [M79.672] Left foot pain (Primary)        ED Disposition Condition    Admit Stable                Ambrosio Montgomery MD  11/14/22 1686

## 2022-11-14 NOTE — H&P
[facility]  Hospital Medicine History & Physical Examination       Patient Name: Michael Pickard  MRN: [id number]  Patient Class: IP- Inpatient   Admission Date: 11/14/2022   Admitting Service: Hospital Medicine   Length of Stay: 0  Attending Physician: Leon Biggs MD  Primary Care Provider: Primary Doctor No  Face-to-Face encounter date: 11/14/2022  Code Status: Full  Chief Complaint: Foot Injury (Pt. C/o L foot pain, swelling, and redness x 1 week s/t stepped on a nail. Pt. Seen for same and taking Abx and given Tdap. )    Source of Information: Patient. Medical Records      HISTORY OF PRESENT ILLNESS:   Michael Pickard is a 37 y.o. male with a PMHx of polysubstance abuse who presented to [facility] on 11/14/2022 with c/o worsening left foot pain with associated redness and swelling x1 week. Patient reports that he stepped on a nail, and was initially seen at an outlying ED where he was given PCN and Cipro along with Tdap vaccination. However, he reports worsening pain, along with redness and swelling despite elevation, abx. Denied fever, chills, CP, SOB, abdominal pain.     Initial ED VS include /100, HR 93, RR 20, SpO2 90%, temperature 98.4° C.  Labs notable for hemoglobin 13.3, hematocrit 38.4, glucose 101, AST 42, ALT 68.  Lactic acid normal. Left foot x-ray negative.  CT with contrast of the left foot revealed localized regional soft tissue edema lateral plantar aspect of the foot near the distal 5th metatarsal and metatarsal head. He was started on IV Zosyn. Blood cultures were not obtained prior to antibiotic administration. Also received IV Toradol in ED. Admitted to hospital medicine services for further work-up and management of care.     REVIEW OF SYSTEMS:   Except as documented, all other systems reviewed and negative     PAST MEDICAL HISTORY:   Polysubstance abuse    PAST SURGICAL HISTORY:   Hernia Repair    FAMILY HISTORY:   Reviewed and negative    SOCIAL HISTORY:   Denied  alcohol. Hx of polysubstance abuse, smokes 1 pack of cigarettes/day.    ALLERGIES:   Patient has no known allergies.    HOME MEDICATIONS:     Prior to Admission medications    Not on File       __________________________________________________________________________  INPATIENT LIST OF MEDICATIONS     Current Facility-Administered Medications:     acetaminophen tablet 650 mg, 650 mg, Oral, Q8H PRN, Caitlyn B Doumit, AGACNP-BC    acetaminophen tablet 650 mg, 650 mg, Oral, Q4H PRN, Caitlyn B Doumit, AGACNP-BC    glucagon (human recombinant) injection 1 mg, 1 mg, Intramuscular, PRN, Caitlyn B Doumit, AGACNP-BC    glucose chewable tablet 16 g, 16 g, Oral, PRN, Caitlyn B Doumit, AGACNP-BC    glucose chewable tablet 24 g, 24 g, Oral, PRN, Caitlyn B Doumit, AGACNP-BC    ondansetron injection 4 mg, 4 mg, Intravenous, Q8H PRN, Caitlyn B Doumit, AGACNP-BC  No current outpatient medications on file.    Scheduled Meds:  Continuous Infusions:  PRN Meds:.acetaminophen, acetaminophen, glucagon (human recombinant), glucose, glucose, ondansetron    PHYSICAL EXAM:     VITAL SIGNS: 24 HRS MIN & MAX LAST   Temp  Min: 98.4 °F (36.9 °C)  Max: 98.4 °F (36.9 °C) 98.4 °F (36.9 °C)   BP  Min: 173/100  Max: 173/100 (!) 173/100     Pulse  Min: 93  Max: 93  93   Resp  Min: 20  Max: 20 20   SpO2  Min: 97 %  Max: 97 % 97 %       General appearance: Well-developed male in no apparent distress.  HENT: Atraumatic head. Moist mucous membranes of oral cavity.  Eyes: Normal extraocular movements.   Neck: Supple.   Lungs: Clear to auscultation bilaterally.   Heart: Regular rate and rhythm. S1 and S2 present. No pedal edema.  Abdomen: Soft, non-distended, non-tender.  Extremities: LLE erythema, edema   Skin: No Rash.   Neuro: Motor and sensory exams grossly intact.   Psych/mental status: Appropriate mood and affect. Responds appropriately to questions.     LABS AND IMAGING:     Recent Labs   Lab 11/14/22  0947   WBC 8.2   RBC 4.42*   HGB 13.3*   HCT 38.4*    MCV 86.9   MCH 30.1   MCHC 34.6   RDW 13.6      MPV 10.6*       Recent Labs   Lab 11/14/22  0947      K 3.9   CO2 25   BUN 14.0   CREATININE 0.73   CALCIUM 9.4   ALBUMIN 4.0   ALKPHOS 87   ALT 68*   AST 42*   BILITOT 0.3       Microbiology Results (last 7 days)       ** No results found for the last 168 hours. **             CT Foot With Contrast Left  Narrative: EXAMINATION:  CT FOOT WITH CONTRAST LEFT    CLINICAL HISTORY:  Foot swelling, nondiabetic, osteomyelitis suspected;    TECHNIQUE:  CT left foot without contrast performed using routine protocol.  .  Automated exposure control used.    COMPARISON:  None    FINDINGS:  There is a localized region of soft tissue edema, thickening involving the lateral plantar aspect of the subcutaneous adipose tissue near the level of the 5th metatarsal head. The metatarsal demonstrates a normal appearance otherwise with no fracture or 0 sub change.  Remaining visualized bones of the foot and ankle are intact with no fracture or dislocation.  Visualized soft tissue structures including tendons and muscular tissue are normal.  Impression: Localized regional soft tissue edema lateral plantar aspect of the foot near the distal 5th metatarsal and metatarsal head.  No osseous abnormality seen.    Electronically signed by: Yenny Perez MD  Date:    11/14/2022  Time:    14:38  X-Ray Foot Complete Left  Narrative: EXAMINATION:  XR FOOT COMPLETE 3 VIEW LEFT    CLINICAL HISTORY:  Other injury of unspecified body region, initial encounter    TECHNIQUE:  AP, lateral and oblique views of the left foot    COMPARISON:  None    FINDINGS:  No acute fracture identified.  Joint alignments are maintained.  No tracking subcutaneous gas or focal sites of osteolysis.  Impression: No acute osseous process appreciated.    Electronically signed by: Rosalio Roth  Date:    11/14/2022  Time:    10:03        ASSESSMENT & PLAN:   LLE Cellulitis   Left Foot Puncture Wound -  Nail  Transaminitis    Plan:  IV abx - Zosyn  Blood cultures x2  PRN analgesics   Check UDS  Labs in AM    VTE Prophylaxis: SCDs    Discharge Planning and Disposition: TBD    I, Caitlyn Arias, NP have reviewed and discussed the case with Leon Biggs MD  Please see the attending MD's addendum for further assessment and plan.    Caitlyn Arias, AGACNP-BC  11/14/2022    _______________________________________________________________________________  MD Addendum:  For this patient encounter, I reviewed the NP/PA/resident documentation, treatment plan, and medical decision making; and I had face-to-face time with this patient.   Assumed patient care at   1700 on 11/14/22    History: Reviewed HPI, medical, surgical, family, and social histories as above    Physical exam: Agree with documentation as above    37 year old male with pmhx of polysubstance abuse with ongoing cellulitis to the L foot after stepping on a nail about a week ago.  Was seen at outside facility and given tetanus shot and Ciprofloxacin. Continued pain and swelling. Denies systemic symptoms. Returned to the ED today due to continued pain and swelling. XR unremarkable. No signs of systemic sepsis. Was started on IV Zosyn. He does have a mild degree of transaminitis but otherwise labs unremarkable.    Keep foot elevated, continue IV antibitoics for now  RUQ US and hepatitis panel for new transaminitis.   If he has significant improvement in symptoms, can likely be discharged on a broader spectrm antibiotics such as Bactrim.         All diagnosis and differential diagnosis have been reviewed; assessment and plan has been documented; I have personally reviewed the labs and test results that are presently available; I have reviewed the patients medication list; I have reviewed the consulting providers response and recommendations. I have reviewed or attempted to review medical records based upon their availability.    All of the patient and family  questions have been addressed and answered. Patient's is agreeable to the above stated plan. I will continue to monitor closely and make adjustments to medical management as needed.      11/14/2022

## 2022-11-14 NOTE — Clinical Note
Diagnosis: Wound infection [[id number]]   Admitting Provider:: KADIE BROWN [[id number]]   Future Attending Provider: KADIE BORWN [[id number]]   Reason for IP Medical Treatment  (Clinical interventions that can only be accomplished in the IP setting? ) :: IV antibiotics   Estimated Length of Stay:: 2 midnights   I certify that Inpatient services for greater than or equal to 2 midnights are medically necessary:: Yes   Plans for Post-Acute care--if anticipated (pick the single best option):: A. No post acute care anticipated at this time   Special Needs:: No Special Needs [1]

## 2022-11-14 NOTE — ED PROVIDER NOTES
Encounter Date: 11/14/2022       History     Chief Complaint   Patient presents with    Foot Injury     Pt. C/o L foot pain, swelling, and redness x 1 week s/t stepped on a nail. Pt. Seen for same and taking Abx and given Tdap.      37 y.o. White male with no known medical history presents to Emergency Department with a chief complaint of L foot pain. Symptoms began 1 week ago after stepping on a nail with his work boot on and have been worsening since onset. Patient was seen at [facility] ER and placed on PCN and Cipro. His foot pain is currently rated as a 8/10 in severity with no radiation. Associated symptoms include redness and swelling to L foot. Symptoms are aggravated with exertion and there are no alleviating factors. The patient denies fever, chills, CP, SOB, or abdominal pain. He reports taking Ibuprofen prior to arrival with mild relief of symptoms. No other reported symptoms at this time.       The history is provided by the patient. No  was used.   Foot Injury   The incident occurred at work. The incident occurred several days ago. The pain is present in the left foot. The pain is at a severity of 8/10. The pain has been Worsening since onset. Pertinent negatives include no numbness, no loss of motion, no loss of sensation and no tingling. The symptoms are aggravated by activity and bearing weight. He has tried NSAIDs, elevation and rest for the symptoms. The treatment provided mild relief.   Review of patient's allergies indicates:  No Known Allergies  History reviewed. No pertinent past medical history.  Past Surgical History:   Procedure Laterality Date    HERNIA REPAIR       No family history on file.  Social History     Tobacco Use    Smoking status: Every Day     Packs/day: 1.00     Types: Cigarettes    Smokeless tobacco: Never   Substance Use Topics    Alcohol use: Never    Drug use: Not Currently     Types: Methamphetamines     Review of Systems   Constitutional:  Negative  for chills, fatigue and fever.   Eyes:  Negative for photophobia and visual disturbance.   Respiratory:  Negative for chest tightness, shortness of breath and stridor.    Cardiovascular:  Negative for chest pain and leg swelling.   Gastrointestinal:  Negative for abdominal pain, diarrhea and vomiting.   Endocrine: Negative for polydipsia, polyphagia and polyuria.   Musculoskeletal:  Positive for arthralgias and joint swelling. Negative for back pain and gait problem.   Skin:  Positive for color change and wound.   Neurological:  Negative for dizziness, tingling, weakness and numbness.   All other systems reviewed and are negative.    Physical Exam     Initial Vitals [11/14/22 0930]   BP Pulse Resp Temp SpO2   (!) 173/100 93 20 98.4 °F (36.9 °C) 97 %      MAP       --         Physical Exam    Nursing note and vitals reviewed.  Constitutional: He appears well-developed and well-nourished. He is not diaphoretic. No distress.   HENT:   Head: Normocephalic and atraumatic.   Right Ear: External ear normal.   Left Ear: External ear normal.   Mouth/Throat: Oropharynx is clear and moist. No oropharyngeal exudate.   Eyes: Conjunctivae and EOM are normal. Pupils are equal, round, and reactive to light. Right eye exhibits no discharge. Left eye exhibits no discharge.   Neck: Neck supple. No JVD present.   Normal range of motion.  Cardiovascular:  Normal rate, regular rhythm, normal heart sounds and intact distal pulses.           Pulmonary/Chest: Breath sounds normal. No stridor. No respiratory distress. He exhibits no tenderness.   Abdominal: Abdomen is soft. Bowel sounds are normal. He exhibits no distension. There is no abdominal tenderness. There is no guarding.   Musculoskeletal:         General: Tenderness (to L foot) and edema (to L foot) present. Normal range of motion.      Cervical back: Normal range of motion and neck supple.     Neurological: He is alert and oriented to person, place, and time. He has normal  strength. No sensory deficit. GCS score is 15. GCS eye subscore is 4. GCS verbal subscore is 5. GCS motor subscore is 6.   Skin: Skin is warm and dry. Capillary refill takes less than 2 seconds. There is erythema (to l anterior foot).   Patient has small hole to sole of foot. Redness, swelling, and tenderness noted to L foot.    Psychiatric: He has a normal mood and affect. Thought content normal.       ED Course   Procedures  Labs Reviewed   CBC WITH DIFFERENTIAL - Abnormal; Notable for the following components:       Result Value    RBC 4.42 (*)     Hgb 13.3 (*)     Hct 38.4 (*)     MPV 10.6 (*)     IG# 0.07 (*)     All other components within normal limits   COMPREHENSIVE METABOLIC PANEL - Abnormal; Notable for the following components:    Glucose Level 101 (*)     Alanine Aminotransferase 68 (*)     Aspartate Aminotransferase 42 (*)     All other components within normal limits   LACTIC ACID, PLASMA - Normal          Imaging Results              CT Foot With Contrast Left (Final result)  Result time 11/14/22 14:38:25      Final result by Félix Perez MD (11/14/22 14:38:25)                   Impression:      Localized regional soft tissue edema lateral plantar aspect of the foot near the distal 5th metatarsal and metatarsal head.  No osseous abnormality seen.      Electronically signed by: Yenny Perez MD  Date:    11/14/2022  Time:    14:38               Narrative:    EXAMINATION:  CT FOOT WITH CONTRAST LEFT    CLINICAL HISTORY:  Foot swelling, nondiabetic, osteomyelitis suspected;    TECHNIQUE:  CT left foot without contrast performed using routine protocol.  .  Automated exposure control used.    COMPARISON:  None    FINDINGS:  There is a localized region of soft tissue edema, thickening involving the lateral plantar aspect of the subcutaneous adipose tissue near the level of the 5th metatarsal head. The metatarsal demonstrates a normal appearance otherwise with no fracture or 0 sub change.   Remaining visualized bones of the foot and ankle are intact with no fracture or dislocation.  Visualized soft tissue structures including tendons and muscular tissue are normal.                                       X-Ray Foot Complete Left (Final result)  Result time 11/14/22 10:03:23      Final result by Rosalio Roth MD (11/14/22 10:03:23)                   Impression:      No acute osseous process appreciated.      Electronically signed by: Rosalio Roth  Date:    11/14/2022  Time:    10:03               Narrative:    EXAMINATION:  XR FOOT COMPLETE 3 VIEW LEFT    CLINICAL HISTORY:  Other injury of unspecified body region, initial encounter    TECHNIQUE:  AP, lateral and oblique views of the left foot    COMPARISON:  None    FINDINGS:  No acute fracture identified.  Joint alignments are maintained.  No tracking subcutaneous gas or focal sites of osteolysis.                                       Medications   piperacillin-tazobactam (ZOSYN) 4.5 g in dextrose 5 % in water (D5W) 5 % 100 mL IVPB (MB+) (4.5 g Intravenous New Bag 11/14/22 1345)   ketorolac injection 30 mg (30 mg Intravenous Given 11/14/22 1345)   iopamidoL (ISOVUE-370) injection 100 mL (100 mLs Intravenous Given 11/14/22 1406)     Medical Decision Making:   Differential Diagnosis:   Osteomyelitis, Cellulitis, Foot Pain   Clinical Tests:   Lab Tests: Ordered and Reviewed  The following lab test(s) were unremarkable: CBC, CMP and Lactate  Radiological Study: Ordered and Reviewed  ED Management:  Due to patient's ongoing complaints despite abx use at home, ordered labs and imaging. Labs unremarkable. Imaging revealed soft tissue edema. Medicated patient with Zosyn and Toradol given to assist with symptoms (patient on suboxone; requesting non-narcotic medication). Discussed patient with Dr. Montgomery, who assessed patient at bedside and recommended admission to hospital for IV antibiotics. Will admit to hospital medicine services. Educated patient on  findings and plan of care.   Other:   I discussed test(s) with the performing physician.  I have discussed this case with another health care provider.       <> Summary of the Discussion: Discussed patient with BASILIA Felix. Will admit to hospital medicine services.            ED Course as of 11/14/22 1512   Mon Nov 14, 2022   1325 Discussed patient with Dr. Montgomery. Recommended Zosyn admin and CT of extremity.  [JA]      ED Course User Index  [JA] Jerilyn Harry NP                 Clinical Impression:   Final diagnoses:  [T14.8XXA, L08.9] Wound infection  [M79.672] Left foot pain (Primary)        ED Disposition Condition    Admit Stable                Jerilyn Harry NP  11/14/22 [id number]

## 2022-11-15 LAB
ALBUMIN SERPL-MCNC: 3.6 GM/DL (ref 3.5–5)
ALBUMIN/GLOB SERPL: 1.2 RATIO (ref 1.1–2)
ALP SERPL-CCNC: 80 UNIT/L (ref 40–150)
ALT SERPL-CCNC: 62 UNIT/L (ref 0–55)
AST SERPL-CCNC: 39 UNIT/L (ref 5–34)
BASOPHILS # BLD AUTO: 0.06 X10(3)/MCL (ref 0–0.2)
BASOPHILS NFR BLD AUTO: 1 %
BILIRUBIN DIRECT+TOT PNL SERPL-MCNC: 0.3 MG/DL
BUN SERPL-MCNC: 14.1 MG/DL (ref 8.9–20.6)
CALCIUM SERPL-MCNC: 9 MG/DL (ref 8.4–10.2)
CHLORIDE SERPL-SCNC: 106 MMOL/L (ref 98–107)
CO2 SERPL-SCNC: 28 MMOL/L (ref 22–29)
CREAT SERPL-MCNC: 0.81 MG/DL (ref 0.73–1.18)
EOSINOPHIL # BLD AUTO: 0.29 X10(3)/MCL (ref 0–0.9)
EOSINOPHIL NFR BLD AUTO: 4.6 %
ERYTHROCYTE [DISTWIDTH] IN BLOOD BY AUTOMATED COUNT: 13.5 % (ref 11.5–17)
GFR SERPLBLD CREATININE-BSD FMLA CKD-EPI: >60 MLS/MIN/1.73/M2
GLOBULIN SER-MCNC: 3 GM/DL (ref 2.4–3.5)
GLUCOSE SERPL-MCNC: 87 MG/DL (ref 74–100)
HAV IGM SERPL QL IA: NONREACTIVE
HBV CORE IGM SERPL QL IA: NONREACTIVE
HBV SURFACE AG SERPL QL IA: NONREACTIVE
HCT VFR BLD AUTO: 37.9 % (ref 42–52)
HCV AB SERPL QL IA: REACTIVE
HGB BLD-MCNC: 13.1 GM/DL (ref 14–18)
IMM GRANULOCYTES # BLD AUTO: 0.06 X10(3)/MCL (ref 0–0.04)
IMM GRANULOCYTES NFR BLD AUTO: 1 %
LYMPHOCYTES # BLD AUTO: 2.3 X10(3)/MCL (ref 0.6–4.6)
LYMPHOCYTES NFR BLD AUTO: 36.5 %
MCH RBC QN AUTO: 30 PG (ref 27–31)
MCHC RBC AUTO-ENTMCNC: 34.6 MG/DL (ref 33–36)
MCV RBC AUTO: 86.9 FL (ref 80–94)
MONOCYTES # BLD AUTO: 0.53 X10(3)/MCL (ref 0.1–1.3)
MONOCYTES NFR BLD AUTO: 8.4 %
NEUTROPHILS # BLD AUTO: 3.1 X10(3)/MCL (ref 2.1–9.2)
NEUTROPHILS NFR BLD AUTO: 48.5 %
NRBC BLD AUTO-RTO: 0 %
PLATELET # BLD AUTO: 268 X10(3)/MCL (ref 130–400)
PMV BLD AUTO: 10.8 FL (ref 7.4–10.4)
POTASSIUM SERPL-SCNC: 4.2 MMOL/L (ref 3.5–5.1)
PROT SERPL-MCNC: 6.6 GM/DL (ref 6.4–8.3)
RBC # BLD AUTO: 4.36 X10(6)/MCL (ref 4.7–6.1)
SODIUM SERPL-SCNC: 139 MMOL/L (ref 136–145)
WBC # SPEC AUTO: 6.3 X10(3)/MCL (ref 4.5–11.5)

## 2022-11-15 PROCEDURE — 85025 COMPLETE CBC W/AUTO DIFF WBC: CPT | Performed by: NURSE PRACTITIONER

## 2022-11-15 PROCEDURE — 36415 COLL VENOUS BLD VENIPUNCTURE: CPT | Performed by: HOSPITALIST

## 2022-11-15 PROCEDURE — G0378 HOSPITAL OBSERVATION PER HR: HCPCS

## 2022-11-15 PROCEDURE — 25000003 PHARM REV CODE 250: Performed by: HOSPITALIST

## 2022-11-15 PROCEDURE — 80053 COMPREHEN METABOLIC PANEL: CPT | Performed by: NURSE PRACTITIONER

## 2022-11-15 PROCEDURE — 63600175 PHARM REV CODE 636 W HCPCS: Performed by: NURSE PRACTITIONER

## 2022-11-15 PROCEDURE — 25000003 PHARM REV CODE 250: Performed by: NURSE PRACTITIONER

## 2022-11-15 PROCEDURE — 96366 THER/PROPH/DIAG IV INF ADDON: CPT

## 2022-11-15 PROCEDURE — 36415 COLL VENOUS BLD VENIPUNCTURE: CPT | Performed by: NURSE PRACTITIONER

## 2022-11-15 PROCEDURE — 80074 ACUTE HEPATITIS PANEL: CPT | Performed by: HOSPITALIST

## 2022-11-15 RX ORDER — OXCARBAZEPINE 300 MG/1
600 TABLET, FILM COATED ORAL 2 TIMES DAILY
Status: DISCONTINUED | OUTPATIENT
Start: 2022-11-15 | End: 2022-11-16 | Stop reason: HOSPADM

## 2022-11-15 RX ORDER — BUPRENORPHINE HYDROCHLORIDE AND NALOXONE HYDROCHLORIDE DIHYDRATE 8; 2 MG/1; MG/1
1 TABLET SUBLINGUAL 3 TIMES DAILY
COMMUNITY
Start: 2022-11-02

## 2022-11-15 RX ORDER — TRAMADOL HYDROCHLORIDE 50 MG/1
50 TABLET ORAL EVERY 4 HOURS PRN
Status: DISCONTINUED | OUTPATIENT
Start: 2022-11-15 | End: 2022-11-16 | Stop reason: HOSPADM

## 2022-11-15 RX ORDER — AMLODIPINE BESYLATE 5 MG/1
5 TABLET ORAL DAILY
COMMUNITY
Start: 2022-11-11 | End: 2022-12-28 | Stop reason: SDUPTHER

## 2022-11-15 RX ORDER — BUPRENORPHINE HYDROCHLORIDE 8 MG/1
8 TABLET SUBLINGUAL 3 TIMES DAILY
Status: DISCONTINUED | OUTPATIENT
Start: 2022-11-15 | End: 2022-11-16 | Stop reason: HOSPADM

## 2022-11-15 RX ORDER — AMLODIPINE BESYLATE 5 MG/1
5 TABLET ORAL DAILY
Status: DISCONTINUED | OUTPATIENT
Start: 2022-11-16 | End: 2022-11-16 | Stop reason: HOSPADM

## 2022-11-15 RX ORDER — QUETIAPINE FUMARATE 200 MG/1
200 TABLET, FILM COATED ORAL NIGHTLY
COMMUNITY
Start: 2022-10-31 | End: 2023-12-07

## 2022-11-15 RX ORDER — QUETIAPINE FUMARATE 100 MG/1
200 TABLET, FILM COATED ORAL NIGHTLY
Status: DISCONTINUED | OUTPATIENT
Start: 2022-11-15 | End: 2022-11-16 | Stop reason: HOSPADM

## 2022-11-15 RX ORDER — OXCARBAZEPINE 600 MG/1
600 TABLET, FILM COATED ORAL 2 TIMES DAILY
COMMUNITY
Start: 2022-10-31 | End: 2023-12-07

## 2022-11-15 RX ORDER — SERTRALINE HYDROCHLORIDE 100 MG/1
100 TABLET, FILM COATED ORAL DAILY
COMMUNITY
Start: 2022-10-31

## 2022-11-15 RX ORDER — SERTRALINE HYDROCHLORIDE 50 MG/1
100 TABLET, FILM COATED ORAL DAILY
Status: DISCONTINUED | OUTPATIENT
Start: 2022-11-16 | End: 2022-11-16 | Stop reason: HOSPADM

## 2022-11-15 RX ORDER — AMOXICILLIN AND CLAVULANATE POTASSIUM 500; 125 MG/1; MG/1
1 TABLET, FILM COATED ORAL 3 TIMES DAILY
Status: ON HOLD | COMMUNITY
Start: 2022-11-11 | End: 2022-11-16 | Stop reason: HOSPADM

## 2022-11-15 RX ADMIN — BUPRENORPHINE 8 MG: 8 TABLET SUBLINGUAL at 09:11

## 2022-11-15 RX ADMIN — PIPERACILLIN AND TAZOBACTAM 4.5 G: 4; .5 INJECTION, POWDER, LYOPHILIZED, FOR SOLUTION INTRAVENOUS; PARENTERAL at 12:11

## 2022-11-15 RX ADMIN — PIPERACILLIN AND TAZOBACTAM 4.5 G: 4; .5 INJECTION, POWDER, LYOPHILIZED, FOR SOLUTION INTRAVENOUS; PARENTERAL at 04:11

## 2022-11-15 RX ADMIN — QUETIAPINE FUMARATE 200 MG: 100 TABLET ORAL at 09:11

## 2022-11-15 RX ADMIN — PIPERACILLIN AND TAZOBACTAM 4.5 G: 4; .5 INJECTION, POWDER, LYOPHILIZED, FOR SOLUTION INTRAVENOUS; PARENTERAL at 09:11

## 2022-11-15 RX ADMIN — OXCARBAZEPINE 600 MG: 300 TABLET, FILM COATED ORAL at 09:11

## 2022-11-15 RX ADMIN — TRAMADOL HYDROCHLORIDE 50 MG: 50 TABLET, COATED ORAL at 09:11

## 2022-11-15 RX ADMIN — TRAMADOL HYDROCHLORIDE 50 MG: 50 TABLET, COATED ORAL at 11:11

## 2022-11-15 RX ADMIN — TRAMADOL HYDROCHLORIDE 50 MG: 50 TABLET, COATED ORAL at 05:11

## 2022-11-15 RX ADMIN — TRAMADOL HYDROCHLORIDE 50 MG: 50 TABLET, COATED ORAL at 04:11

## 2022-11-15 NOTE — PROGRESS NOTES
[facility]  Hospital Medicine Progress Note        Chief Complaint: Inpatient Follow-up for foot pain    HPI:   37 y.o. male with a PMHx of polysubstance abuse who presented to [facility] on 11/14/2022 with c/o worsening left foot pain with associated redness and swelling x1 week. Patient reports that he stepped on a nail, and was initially seen at an outlying ED where he was given PCN and Cipro along with Tdap vaccination. However, he reports worsening pain, along with redness and swelling despite elevation, abx. Denied fever, chills, CP, SOB, abdominal pain.      Initial ED VS include /100, HR 93, RR 20, SpO2 90%, temperature 98.4° C.  Labs notable for hemoglobin 13.3, hematocrit 38.4, glucose 101, AST 42, ALT 68.  Lactic acid normal. Left foot x-ray negative.  CT with contrast of the left foot revealed localized regional soft tissue edema lateral plantar aspect of the foot near the distal 5th metatarsal and metatarsal head. He was started on IV Zosyn. Blood cultures were not obtained prior to antibiotic administration. Also received IV Toradol in ED. Admitted to hospital medicine services for further work-up and management of care.        Interval Hx:  Swelling improved this morning as has erythema.  Does report some burning type pain in the sole of his foot.  No drainage.  Denies any known history of any liver issues.  Reports that he was on Suboxone as an outpatient.  Does not know dosing or his other home medications.  Reports that he was recently changed on his blood pressure medicines but not sure what med he is on.    Objective/physical exam:  General: In no acute distress, afebrile  Chest: Clear to auscultation bilaterally  Heart: RRR, +S1, S2, no appreciable murmur  Abdomen: Soft, nontender, BS +  MSK: Warm, no lower extremity edema, no clubbing or cyanosis  Neurologic: Alert and oriented x4, Cranial nerve II-XII intact, Strength 5/5 in all 4 extremities    VITAL SIGNS: 24  HRS MIN & MAX LAST   Temp  Min: 97.6 °F (36.4 °C)  Max: 98.4 °F (36.9 °C) 97.6 °F (36.4 °C)   BP  Min: 118/70  Max: 173/100 (!) 152/74     Pulse  Min: 76  Max: 93  78   Resp  Min: 17  Max: 20 18   SpO2  Min: 95 %  Max: 99 % 96 %       Recent Labs   Lab 11/14/22  0947 11/15/22  0521   WBC 8.2 6.3   RBC 4.42* 4.36*   HGB 13.3* 13.1*   HCT 38.4* 37.9*   MCV 86.9 86.9   MCH 30.1 30.0   MCHC 34.6 34.6   RDW 13.6 13.5    268   MPV 10.6* 10.8*       Recent Labs   Lab 11/14/22  0947 11/15/22  0521    139   K 3.9 4.2   CO2 25 28   BUN 14.0 14.1   CREATININE 0.73 0.81   CALCIUM 9.4 9.0   ALBUMIN 4.0 3.6   ALKPHOS 87 80   ALT 68* 62*   AST 42* 39*   BILITOT 0.3 0.3          Microbiology Results (last 7 days)       Procedure Component Value Units Date/Time    Blood Culture [[id number]] Collected: 11/14/22 1548    Order Status: Resulted Specimen: Blood from Hand, Right Updated: 11/14/22 1600    Blood Culture [[id number]] Collected: 11/14/22 1548    Order Status: Resulted Specimen: Blood from Arm, Left Updated: 11/14/22 1559             See below for Radiology    Scheduled Med:   piperacillin-tazobactam (ZOSYN) IVPB  4.5 g Intravenous Q8H        Continuous Infusions:       PRN Meds:  acetaminophen, acetaminophen, glucagon (human recombinant), glucose, glucose, ondansetron, traMADoL       Assessment/Plan:   LLE Cellulitis   Left Foot Puncture Wound - Nail  Transaminitis    Check a right upper quadrant ultrasound and hepatitis panel this morning.    Continue with empiric Zosyn for now though he does not appear to be acutely septic.  Can likely transition him to oral Bactrim.  Like to get him back on his home oral pain regimen.  He is in chronic pain management.    Discussed with him that he can likely go home in the next 24 hours if symptoms control.  Need to get his home blood pressure regimen restarted as well.    Patient condition:  Stable    Anticipated discharge and Disposition: TBD      All diagnosis and  differential diagnosis have been reviewed; assessment and plan has been documented; I have personally reviewed the labs and test results that are presently available; I have reviewed the patients medication list; I have reviewed the consulting providers response and recommendations. I have reviewed or attempted to review medical records based upon their availability    All of the patient's questions have been  addressed and answered. Patient's is agreeable to the above stated plan. I will continue to monitor closely and make adjustments to medical management as needed.  _____________________________________________________________________      Radiology:  CT Foot With Contrast Left  Narrative: EXAMINATION:  CT FOOT WITH CONTRAST LEFT    CLINICAL HISTORY:  Foot swelling, nondiabetic, osteomyelitis suspected;    TECHNIQUE:  CT left foot without contrast performed using routine protocol.  .  Automated exposure control used.    COMPARISON:  None    FINDINGS:  There is a localized region of soft tissue edema, thickening involving the lateral plantar aspect of the subcutaneous adipose tissue near the level of the 5th metatarsal head. The metatarsal demonstrates a normal appearance otherwise with no fracture or 0 sub change.  Remaining visualized bones of the foot and ankle are intact with no fracture or dislocation.  Visualized soft tissue structures including tendons and muscular tissue are normal.  Impression: Localized regional soft tissue edema lateral plantar aspect of the foot near the distal 5th metatarsal and metatarsal head.  No osseous abnormality seen.    Electronically signed by: Yenny Perez MD  Date:    11/14/2022  Time:    14:38  X-Ray Foot Complete Left  Narrative: EXAMINATION:  XR FOOT COMPLETE 3 VIEW LEFT    CLINICAL HISTORY:  Other injury of unspecified body region, initial encounter    TECHNIQUE:  AP, lateral and oblique views of the left foot    COMPARISON:  None    FINDINGS:  No acute  fracture identified.  Joint alignments are maintained.  No tracking subcutaneous gas or focal sites of osteolysis.  Impression: No acute osseous process appreciated.    Electronically signed by: Rosalio Roth  Date:    11/14/2022  Time:    10:03      Leon Biggs MD   11/15/2022

## 2022-11-16 VITALS
SYSTOLIC BLOOD PRESSURE: 162 MMHG | HEIGHT: 73 IN | TEMPERATURE: 98 F | BODY MASS INDEX: 27.17 KG/M2 | WEIGHT: 205 LBS | DIASTOLIC BLOOD PRESSURE: 78 MMHG | OXYGEN SATURATION: 96 % | RESPIRATION RATE: 18 BRPM | HEART RATE: 82 BPM

## 2022-11-16 PROBLEM — L03.90 CELLULITIS: Status: ACTIVE | Noted: 2022-11-16

## 2022-11-16 PROCEDURE — 25000003 PHARM REV CODE 250: Performed by: NURSE PRACTITIONER

## 2022-11-16 PROCEDURE — 63600175 PHARM REV CODE 636 W HCPCS: Performed by: NURSE PRACTITIONER

## 2022-11-16 PROCEDURE — 25000003 PHARM REV CODE 250: Performed by: HOSPITALIST

## 2022-11-16 PROCEDURE — G0378 HOSPITAL OBSERVATION PER HR: HCPCS

## 2022-11-16 PROCEDURE — 96366 THER/PROPH/DIAG IV INF ADDON: CPT

## 2022-11-16 RX ORDER — GABAPENTIN 300 MG/1
300 CAPSULE ORAL 3 TIMES DAILY
Status: DISCONTINUED | OUTPATIENT
Start: 2022-11-16 | End: 2022-11-16 | Stop reason: HOSPADM

## 2022-11-16 RX ORDER — GABAPENTIN 300 MG/1
300 CAPSULE ORAL 3 TIMES DAILY
Qty: 90 CAPSULE | Refills: 11 | Status: SHIPPED | OUTPATIENT
Start: 2022-11-16 | End: 2022-12-28

## 2022-11-16 RX ORDER — SULFAMETHOXAZOLE AND TRIMETHOPRIM 800; 160 MG/1; MG/1
1 TABLET ORAL 2 TIMES DAILY
Status: DISCONTINUED | OUTPATIENT
Start: 2022-11-16 | End: 2022-11-16 | Stop reason: HOSPADM

## 2022-11-16 RX ORDER — SULFAMETHOXAZOLE AND TRIMETHOPRIM 800; 160 MG/1; MG/1
1 TABLET ORAL 2 TIMES DAILY
Qty: 14 TABLET | Refills: 0 | Status: SHIPPED | OUTPATIENT
Start: 2022-11-16 | End: 2022-11-23

## 2022-11-16 RX ORDER — TRAMADOL HYDROCHLORIDE 50 MG/1
50 TABLET ORAL EVERY 4 HOURS PRN
Qty: 21 TABLET | Refills: 0 | Status: SHIPPED | OUTPATIENT
Start: 2022-11-16 | End: 2022-11-23

## 2022-11-16 RX ADMIN — TRAMADOL HYDROCHLORIDE 50 MG: 50 TABLET, COATED ORAL at 06:11

## 2022-11-16 RX ADMIN — SERTRALINE HYDROCHLORIDE 100 MG: 50 TABLET ORAL at 08:11

## 2022-11-16 RX ADMIN — PIPERACILLIN AND TAZOBACTAM 4.5 G: 4; .5 INJECTION, POWDER, LYOPHILIZED, FOR SOLUTION INTRAVENOUS; PARENTERAL at 12:11

## 2022-11-16 RX ADMIN — GABAPENTIN 300 MG: 300 CAPSULE ORAL at 08:11

## 2022-11-16 RX ADMIN — OXCARBAZEPINE 600 MG: 300 TABLET, FILM COATED ORAL at 08:11

## 2022-11-16 RX ADMIN — SULFAMETHOXAZOLE AND TRIMETHOPRIM 1 TABLET: 800; 160 TABLET ORAL at 08:11

## 2022-11-16 RX ADMIN — BUPRENORPHINE 8 MG: 8 TABLET SUBLINGUAL at 10:11

## 2022-11-16 RX ADMIN — AMLODIPINE BESYLATE 5 MG: 5 TABLET ORAL at 08:11

## 2022-11-16 NOTE — DISCHARGE SUMMARY
[facility]  Hospital Medicine Discharge Summary    Admit Date: 11/14/2022  Discharge Date and Time: 11/16/20227:53 AM  Admitting Physician: Hospitalist team   Discharging Physician: Leon Biggs MD.  Primary Care Physician: Primary Doctor No      Discharge Diagnoses:  LLE Cellulitis   Left Foot Puncture Wound - Nail  Hepatitis C  Polysubstance abuse    Hospital Course:   37 y.o. male with a PMHx of polysubstance abuse who presented to [facility] on 11/14/2022 with c/o worsening left foot pain with associated redness and swelling x1 week. Patient reports that he stepped on a nail, and was initially seen at an outlying ED where he was given PCN and Cipro along with Tdap vaccination. However, he reports worsening pain, along with redness and swelling despite elevation, abx. Denied fever, chills, CP, SOB, abdominal pain.      Initial ED VS include /100, HR 93, RR 20, SpO2 90%, temperature 98.4° C.  Labs notable for hemoglobin 13.3, hematocrit 38.4, glucose 101, AST 42, ALT 68.  Lactic acid normal. Left foot x-ray negative.  CT with contrast of the left foot revealed localized regional soft tissue edema lateral plantar aspect of the foot near the distal 5th metatarsal and metatarsal head. He was started on IV Zosyn. Blood cultures were not obtained prior to antibiotic administration. Also received IV Toradol in ED. Admitted to hospital medicine services for further work-up and management of care.  Swelling and pain improved with conservative management.  He was changed to oral Bactrim on 11/16.  Cultures remained negative.  He was noted to have a mild transaminitis.  Right upper quadrant ultrasound was negative for any signs of cirrhosis.  Acute hepatitis panel did reveal hepatitis-C.  He has a history of IV drug use.  Encouraged him to follow up with a PCP and if you is a candidate for hep C treatment can follow-up with GI as an outpatient.  No signs of fulminant hepatitis or cirrhosis.   "Will complete 5 more days of oral Bactrim as an outpatient.  Prescription was sent for gabapentin to help with his neuropathy and pain.  His wife is at the bedside for discharge planning.  Case management has been asked to help set him up with a PCP follow up    Vitals:  Blood pressure 133/82, pulse 82, temperature 99.4 °F (37.4 °C), temperature source Oral, resp. rate 16, height 6' 1" (1.854 m), weight 93 kg (205 lb), SpO2 96 %..    Physical Exam:  Awake, Alert, Oriented x 3, No new focal Neurologic deficit, Normal Affect  NC/AT, PERRLA, EOMI  Supple neck, no JVD, No cervical lymphadenopathy  Symmetrical chest, Good air entry bilaterally. No rhonchi, wheezes, crackles appreciated  RRR, No gallop, rub or murmur  +ve Bowel sounds x4, Abd soft and non tender, no rebound, guarding or rigidity  No Cyanosis, cludding or edema, No new rash or bruises    Procedures Performed: No admission procedures for hospital encounter.     Significant Diagnostic Studies: See Full reports for all details  Admission on 11/14/2022   Component Date Value    WBC 11/14/2022 8.2     RBC 11/14/2022 4.42 (L)     Hgb 11/14/2022 13.3 (L)     Hct 11/14/2022 38.4 (L)     MCV 11/14/2022 86.9     MCH 11/14/2022 30.1     MCHC 11/14/2022 34.6     RDW 11/14/2022 13.6     Platelet 11/14/2022 240     MPV 11/14/2022 10.6 (H)     Neut % 11/14/2022 60.4     Lymph % 11/14/2022 25.3     Mono % 11/14/2022 9.9     Eos % 11/14/2022 2.8     Basophil % 11/14/2022 0.7     Lymph # 11/14/2022 2.08     Neut # 11/14/2022 5.0     Mono # 11/14/2022 0.81     Eos # 11/14/2022 0.23     Baso # 11/14/2022 0.06     IG# 11/14/2022 0.07 (H)     IG% 11/14/2022 0.9     NRBC% 11/14/2022 0.0     Sodium Level 11/14/2022 140     Potassium Level 11/14/2022 3.9     Chloride 11/14/2022 107     Carbon Dioxide 11/14/2022 25     Glucose Level 11/14/2022 101 (H)     Blood Urea Nitrogen 11/14/2022 14.0     Creatinine 11/14/2022 0.73     Calcium Level Total 11/14/2022 9.4     Protein Total " 11/14/2022 7.0     Albumin Level 11/14/2022 4.0     Globulin 11/14/2022 3.0     Albumin/Globulin Ratio 11/14/2022 1.3     Bilirubin Total 11/14/2022 0.3     Alkaline Phosphatase 11/14/2022 87     Alanine Aminotransferase 11/14/2022 68 (H)     Aspartate Aminotransfera* 11/14/2022 42 (H)     eGFR 11/14/2022 >60     Lactic Acid Level 11/14/2022 1.4     CULTURE, BLOOD ([facility]) 11/14/2022 No Growth At 24 Hours     CULTURE, BLOOD ([facility]) 11/14/2022 No Growth At 24 Hours     Sodium Level 11/15/2022 139     Potassium Level 11/15/2022 4.2     Chloride 11/15/2022 106     Carbon Dioxide 11/15/2022 28     Glucose Level 11/15/2022 87     Blood Urea Nitrogen 11/15/2022 14.1     Creatinine 11/15/2022 0.81     Calcium Level Total 11/15/2022 9.0     Protein Total 11/15/2022 6.6     Albumin Level 11/15/2022 3.6     Globulin 11/15/2022 3.0     Albumin/Globulin Ratio 11/15/2022 1.2     Bilirubin Total 11/15/2022 0.3     Alkaline Phosphatase 11/15/2022 80     Alanine Aminotransferase 11/15/2022 62 (H)     Aspartate Aminotransfera* 11/15/2022 39 (H)     eGFR 11/15/2022 >60     WBC 11/15/2022 6.3     RBC 11/15/2022 4.36 (L)     Hgb 11/15/2022 13.1 (L)     Hct 11/15/2022 37.9 (L)     MCV 11/15/2022 86.9     MCH 11/15/2022 30.0     MCHC 11/15/2022 34.6     RDW 11/15/2022 13.5     Platelet 11/15/2022 268     MPV 11/15/2022 10.8 (H)     Neut % 11/15/2022 48.5     Lymph % 11/15/2022 36.5     Mono % 11/15/2022 8.4     Eos % 11/15/2022 4.6     Basophil % 11/15/2022 1.0     Lymph # 11/15/2022 2.30     Neut # 11/15/2022 3.1     Mono # 11/15/2022 0.53     Eos # 11/15/2022 0.29     Baso # 11/15/2022 0.06     IG# 11/15/2022 0.06 (H)     IG% 11/15/2022 1.0     NRBC% 11/15/2022 0.0     Hepatitis A IgM 11/15/2022 Nonreactive     Hepatitis B Core IgM 11/15/2022 Nonreactive     Hepatitis B Surface Anti* 11/15/2022 Nonreactive     Hepatitis C Antibody 11/15/2022 Reactive (A)         Microbiology Results (last 7 days)       Procedure Component Value Units  Date/Time    Blood Culture [[id number]]  (Normal) Collected: 11/14/22 1548    Order Status: Completed Specimen: Blood from Arm, Left Updated: 11/15/22 1701     CULTURE, BLOOD ([facility]) No Growth At 24 Hours    Blood Culture [[id number]]  (Normal) Collected: 11/14/22 1548    Order Status: Completed Specimen: Blood from Hand, Right Updated: 11/15/22 1701     CULTURE, BLOOD ([facility]) No Growth At 24 Hours             X-Ray Foot Complete Left    Result Date: 11/14/2022  EXAMINATION: XR FOOT COMPLETE 3 VIEW LEFT CLINICAL HISTORY: Other injury of unspecified body region, initial encounter TECHNIQUE: AP, lateral and oblique views of the left foot COMPARISON: None FINDINGS: No acute fracture identified.  Joint alignments are maintained.  No tracking subcutaneous gas or focal sites of osteolysis.     No acute osseous process appreciated. Electronically signed by: Rosalio Roth Date:    11/14/2022 Time:    10:03    CT Foot With Contrast Left    Result Date: 11/14/2022  EXAMINATION: CT FOOT WITH CONTRAST LEFT CLINICAL HISTORY: Foot swelling, nondiabetic, osteomyelitis suspected; TECHNIQUE: CT left foot without contrast performed using routine protocol.  .  Automated exposure control used. COMPARISON: None FINDINGS: There is a localized region of soft tissue edema, thickening involving the lateral plantar aspect of the subcutaneous adipose tissue near the level of the 5th metatarsal head. The metatarsal demonstrates a normal appearance otherwise with no fracture or 0 sub change.  Remaining visualized bones of the foot and ankle are intact with no fracture or dislocation.  Visualized soft tissue structures including tendons and muscular tissue are normal.     Localized regional soft tissue edema lateral plantar aspect of the foot near the distal 5th metatarsal and metatarsal head.  No osseous abnormality seen. Electronically signed by: Yenny Perez MD Date:    11/14/2022 Time:    14:38    US Abdomen Limited    Result Date:  11/15/2022  EXAMINATION: US ABDOMEN LIMITED CLINICAL HISTORY: Transaminit;, . TECHNIQUE: Transverse and longitudinal images of the right upper abdomen were obtained. COMPARISON: None FINDINGS: Liver: Size: 16 cm in the right midclavicular line, normal Appearance: Normal  echogenicity, smooth  contour Mass: No focal masses Gallbladder: Stones/Sludge: None Appearance: No wall thickening, pericholecystic fluid or hydrops Sonographic Salas's Sign: Negative Bile Ducts: Intrahepatic Ducts: No dilatation Extrahepatic Ducts: Common bile duct measures 0.39 cm, no dilatation Pancreas: Visualized portions of the pancreas are normal. Right Kidney: Size: 13.3 cm in length Echogenicity: Normal Collecting System: No hydronephrosis Stone: None Cyst/Mass: None Vessels: Aorta: Visualized portions are normal. Inferior Vena Cava: Visualized portions are normal. Main Portal Vein: Patent with hepatopedal  flow. Free Fluid: No ascites or pleural effusions     No significant abnormality identified Electronically signed by: Rafael Alonso Date:    11/15/2022 Time:    11:39  - pulls last radiology orders        Medication List        START taking these medications      gabapentin 300 MG capsule  Commonly known as: NEURONTIN  Take 1 capsule (300 mg total) by mouth 3 (three) times daily.     sulfamethoxazole-trimethoprim 800-160mg 800-160 mg Tab  Commonly known as: BACTRIM DS  Take 1 tablet by mouth 2 (two) times daily. for 7 days     traMADoL 50 mg tablet  Commonly known as: ULTRAM  Take 1 tablet (50 mg total) by mouth every 4 (four) hours as needed for Pain.            CONTINUE taking these medications      amLODIPine 5 MG tablet  Commonly known as: NORVASC     buprenorphine-naloxone 8-2 8-2 mg Subl  Commonly known as: SUBOXONE     OXcarbazepine 600 MG Tab  Commonly known as: TRILEPTAL     QUEtiapine 200 MG Tab  Commonly known as: SEROQUEL     sertraline 100 MG tablet  Commonly known as: ZOLOFT            STOP taking these medications       amoxicillin-clavulanate 500-125mg 500-125 mg Tab  Commonly known as: AUGMENTIN               Where to Get Your Medications        These medications were sent to [id number] - [place], [place]  [address] AT [facility] [place] [address] [facility]  [address], [place] [address]      Phone: [phone]   gabapentin 300 MG capsule  sulfamethoxazole-trimethoprim 800-160mg 800-160 mg Tab  traMADoL 50 mg tablet          Explained in detail to the patient about the discharge plan, medications, and follow-up visits. Pt understands and agrees with the treatment plan  Discharged Condition: stable  Diet: regular  Disposition: home    Medications Per DC med rec  Activities as tolerated  Follow up with your PCP in 2 wks   For further questions contact hospitalist office    Discharge time 33 minutes    For worsening symptoms, chest pain, shortness of breath, increased abdominal pain, high grade fever, stroke or stroke like symptoms, immediately go to the nearest Emergency Room or call 911 as soon as possible.      Leon Gaitan M.D, on 11/16/2022. at 7:53 AM.

## 2022-11-17 LAB — PATH REV: NORMAL

## 2022-11-19 LAB
BACTERIA BLD CULT: NORMAL
BACTERIA BLD CULT: NORMAL

## 2022-12-28 ENCOUNTER — HOSPITAL ENCOUNTER (OUTPATIENT)
Dept: RADIOLOGY | Facility: HOSPITAL | Age: 37
Discharge: HOME | End: 2022-12-28
Attending: STUDENT IN AN ORGANIZED HEALTH CARE EDUCATION/TRAINING PROGRAM
Payer: MEDICAID

## 2022-12-28 ENCOUNTER — OFFICE VISIT (OUTPATIENT)
Dept: FAMILY MEDICINE | Facility: CLINIC | Age: 37
End: 2022-12-28
Payer: MEDICAID

## 2022-12-28 VITALS
HEART RATE: 83 BPM | HEIGHT: 73 IN | TEMPERATURE: 98 F | DIASTOLIC BLOOD PRESSURE: 102 MMHG | SYSTOLIC BLOOD PRESSURE: 158 MMHG | OXYGEN SATURATION: 97 % | BODY MASS INDEX: 27.02 KG/M2 | WEIGHT: 203.88 LBS

## 2022-12-28 DIAGNOSIS — S91.332S PUNCTURE WOUND OF LEFT FOOT, SEQUELA: ICD-10-CM

## 2022-12-28 DIAGNOSIS — I10 UNCONTROLLED HYPERTENSION: ICD-10-CM

## 2022-12-28 DIAGNOSIS — B35.3 TINEA PEDIS OF BOTH FEET: ICD-10-CM

## 2022-12-28 DIAGNOSIS — F33.40 RECURRENT MAJOR DEPRESSIVE DISORDER, IN REMISSION: ICD-10-CM

## 2022-12-28 DIAGNOSIS — M79.672 FOOT PAIN, LEFT: Primary | ICD-10-CM

## 2022-12-28 DIAGNOSIS — F17.200 CURRENT EVERY DAY SMOKER: ICD-10-CM

## 2022-12-28 DIAGNOSIS — G62.9 NEUROPATHY: ICD-10-CM

## 2022-12-28 DIAGNOSIS — R74.8 ELEVATED LIVER ENZYMES: ICD-10-CM

## 2022-12-28 DIAGNOSIS — Z00.00 WELLNESS EXAMINATION: ICD-10-CM

## 2022-12-28 DIAGNOSIS — R76.8 HEPATITIS C ANTIBODY POSITIVE IN BLOOD: ICD-10-CM

## 2022-12-28 DIAGNOSIS — I10 HYPERTENSION, UNSPECIFIED TYPE: ICD-10-CM

## 2022-12-28 DIAGNOSIS — M79.672 FOOT PAIN, LEFT: ICD-10-CM

## 2022-12-28 LAB
ALBUMIN SERPL-MCNC: 4.5 G/DL (ref 3.5–5)
ALBUMIN/GLOB SERPL: 1.3 RATIO (ref 1.1–2)
ALP SERPL-CCNC: 75 UNIT/L (ref 40–150)
ALT SERPL-CCNC: 91 UNIT/L (ref 0–55)
AST SERPL-CCNC: 55 UNIT/L (ref 5–34)
BASOPHILS # BLD AUTO: 0.08 X10(3)/MCL (ref 0–0.2)
BASOPHILS NFR BLD AUTO: 0.6 %
BILIRUBIN DIRECT+TOT PNL SERPL-MCNC: 0.5 MG/DL
BUN SERPL-MCNC: 14.7 MG/DL (ref 8.9–20.6)
CALCIUM SERPL-MCNC: 9.9 MG/DL (ref 8.4–10.2)
CHLORIDE SERPL-SCNC: 105 MMOL/L (ref 98–107)
CHOLEST SERPL-MCNC: 268 MG/DL
CHOLEST/HDLC SERPL: 7 {RATIO} (ref 0–5)
CO2 SERPL-SCNC: 25 MMOL/L (ref 22–29)
CREAT SERPL-MCNC: 0.85 MG/DL (ref 0.73–1.18)
EOSINOPHIL # BLD AUTO: 0.17 X10(3)/MCL (ref 0–0.9)
EOSINOPHIL NFR BLD AUTO: 1.3 %
ERYTHROCYTE [DISTWIDTH] IN BLOOD BY AUTOMATED COUNT: 13.2 % (ref 11.6–14.4)
EST. AVERAGE GLUCOSE BLD GHB EST-MCNC: 91.1 MG/DL
GFR SERPLBLD CREATININE-BSD FMLA CKD-EPI: >60 MLS/MIN/1.73/M2
GLOBULIN SER-MCNC: 3.4 GM/DL (ref 2.4–3.5)
GLUCOSE SERPL-MCNC: 101 MG/DL (ref 74–100)
HBA1C MFR BLD: 4.8 %
HCT VFR BLD AUTO: 43.4 % (ref 42–52)
HDLC SERPL-MCNC: 36 MG/DL (ref 35–60)
HGB BLD-MCNC: 15.2 GM/DL (ref 14–18)
HIV 1+2 AB+HIV1 P24 AG SERPL QL IA: NONREACTIVE
IMM GRANULOCYTES # BLD AUTO: 0.02 X10(3)/MCL (ref 0–0.04)
IMM GRANULOCYTES NFR BLD AUTO: 0.2 %
LDLC SERPL CALC-MCNC: 196 MG/DL (ref 50–140)
LYMPHOCYTES # BLD AUTO: 2.65 X10(3)/MCL (ref 0.6–4.6)
LYMPHOCYTES NFR BLD AUTO: 19.9 %
MCH RBC QN AUTO: 30.1 PG
MCHC RBC AUTO-ENTMCNC: 35 MG/DL (ref 33–36)
MCV RBC AUTO: 85.9 FL (ref 80–94)
MONOCYTES # BLD AUTO: 0.91 X10(3)/MCL (ref 0.1–1.3)
MONOCYTES NFR BLD AUTO: 6.8 %
NEUTROPHILS # BLD AUTO: 9.5 X10(3)/MCL (ref 2.1–9.2)
NEUTROPHILS NFR BLD AUTO: 71.2 %
NRBC BLD AUTO-RTO: 0 % (ref 0–1)
PLATELET # BLD AUTO: 288 X10(3)/MCL (ref 140–371)
PMV BLD AUTO: 11.1 FL (ref 9.4–12.4)
POTASSIUM SERPL-SCNC: 4.5 MMOL/L (ref 3.5–5.1)
PROT SERPL-MCNC: 7.9 GM/DL (ref 6.4–8.3)
RBC # BLD AUTO: 5.05 X10(6)/MCL (ref 4.7–6.1)
SODIUM SERPL-SCNC: 138 MMOL/L (ref 136–145)
T4 FREE SERPL-MCNC: 0.83 NG/DL (ref 0.7–1.48)
TRIGL SERPL-MCNC: 180 MG/DL (ref 34–140)
TSH SERPL-ACNC: 0.75 UIU/ML (ref 0.35–4.94)
VLDLC SERPL CALC-MCNC: 36 MG/DL
WBC # SPEC AUTO: 13.3 X10(3)/MCL (ref 4.5–11.5)

## 2022-12-28 PROCEDURE — 85025 COMPLETE CBC W/AUTO DIFF WBC: CPT | Performed by: STUDENT IN AN ORGANIZED HEALTH CARE EDUCATION/TRAINING PROGRAM

## 2022-12-28 PROCEDURE — 3008F BODY MASS INDEX DOCD: CPT | Mod: CPTII,,, | Performed by: STUDENT IN AN ORGANIZED HEALTH CARE EDUCATION/TRAINING PROGRAM

## 2022-12-28 PROCEDURE — 3008F PR BODY MASS INDEX (BMI) DOCUMENTED: ICD-10-PCS | Mod: CPTII,,, | Performed by: STUDENT IN AN ORGANIZED HEALTH CARE EDUCATION/TRAINING PROGRAM

## 2022-12-28 PROCEDURE — 84439 ASSAY OF FREE THYROXINE: CPT | Performed by: STUDENT IN AN ORGANIZED HEALTH CARE EDUCATION/TRAINING PROGRAM

## 2022-12-28 PROCEDURE — 84443 ASSAY THYROID STIM HORMONE: CPT | Performed by: STUDENT IN AN ORGANIZED HEALTH CARE EDUCATION/TRAINING PROGRAM

## 2022-12-28 PROCEDURE — 99215 OFFICE O/P EST HI 40 MIN: CPT | Mod: PBBFAC,PN | Performed by: STUDENT IN AN ORGANIZED HEALTH CARE EDUCATION/TRAINING PROGRAM

## 2022-12-28 PROCEDURE — 73630 X-RAY EXAM OF FOOT: CPT | Mod: TC,PN,LT

## 2022-12-28 PROCEDURE — 80061 LIPID PANEL: CPT | Performed by: STUDENT IN AN ORGANIZED HEALTH CARE EDUCATION/TRAINING PROGRAM

## 2022-12-28 PROCEDURE — 3080F PR MOST RECENT DIASTOLIC BLOOD PRESSURE >= 90 MM HG: ICD-10-PCS | Mod: CPTII,,, | Performed by: STUDENT IN AN ORGANIZED HEALTH CARE EDUCATION/TRAINING PROGRAM

## 2022-12-28 PROCEDURE — 99395 PREV VISIT EST AGE 18-39: CPT | Mod: S$PBB,,, | Performed by: STUDENT IN AN ORGANIZED HEALTH CARE EDUCATION/TRAINING PROGRAM

## 2022-12-28 PROCEDURE — 1159F PR MEDICATION LIST DOCUMENTED IN MEDICAL RECORD: ICD-10-PCS | Mod: CPTII,,, | Performed by: STUDENT IN AN ORGANIZED HEALTH CARE EDUCATION/TRAINING PROGRAM

## 2022-12-28 PROCEDURE — 99204 OFFICE O/P NEW MOD 45 MIN: CPT | Mod: 25,S$PBB,, | Performed by: STUDENT IN AN ORGANIZED HEALTH CARE EDUCATION/TRAINING PROGRAM

## 2022-12-28 PROCEDURE — 80053 COMPREHEN METABOLIC PANEL: CPT | Performed by: STUDENT IN AN ORGANIZED HEALTH CARE EDUCATION/TRAINING PROGRAM

## 2022-12-28 PROCEDURE — 87389 HIV-1 AG W/HIV-1&-2 AB AG IA: CPT | Performed by: STUDENT IN AN ORGANIZED HEALTH CARE EDUCATION/TRAINING PROGRAM

## 2022-12-28 PROCEDURE — 99204 PR OFFICE/OUTPT VISIT, NEW, LEVL IV, 45-59 MIN: ICD-10-PCS | Mod: 25,S$PBB,, | Performed by: STUDENT IN AN ORGANIZED HEALTH CARE EDUCATION/TRAINING PROGRAM

## 2022-12-28 PROCEDURE — 99395 PR PREVENTIVE VISIT,EST,18-39: ICD-10-PCS | Mod: S$PBB,,, | Performed by: STUDENT IN AN ORGANIZED HEALTH CARE EDUCATION/TRAINING PROGRAM

## 2022-12-28 PROCEDURE — 3080F DIAST BP >= 90 MM HG: CPT | Mod: CPTII,,, | Performed by: STUDENT IN AN ORGANIZED HEALTH CARE EDUCATION/TRAINING PROGRAM

## 2022-12-28 PROCEDURE — 36415 COLL VENOUS BLD VENIPUNCTURE: CPT | Performed by: STUDENT IN AN ORGANIZED HEALTH CARE EDUCATION/TRAINING PROGRAM

## 2022-12-28 PROCEDURE — 3077F SYST BP >= 140 MM HG: CPT | Mod: CPTII,,, | Performed by: STUDENT IN AN ORGANIZED HEALTH CARE EDUCATION/TRAINING PROGRAM

## 2022-12-28 PROCEDURE — 3077F PR MOST RECENT SYSTOLIC BLOOD PRESSURE >= 140 MM HG: ICD-10-PCS | Mod: CPTII,,, | Performed by: STUDENT IN AN ORGANIZED HEALTH CARE EDUCATION/TRAINING PROGRAM

## 2022-12-28 PROCEDURE — 83036 HEMOGLOBIN GLYCOSYLATED A1C: CPT | Performed by: STUDENT IN AN ORGANIZED HEALTH CARE EDUCATION/TRAINING PROGRAM

## 2022-12-28 PROCEDURE — 1159F MED LIST DOCD IN RCRD: CPT | Mod: CPTII,,, | Performed by: STUDENT IN AN ORGANIZED HEALTH CARE EDUCATION/TRAINING PROGRAM

## 2022-12-28 RX ORDER — AMLODIPINE BESYLATE 5 MG/1
5 TABLET ORAL DAILY
Qty: 30 TABLET | Refills: 2 | Status: SHIPPED | OUTPATIENT
Start: 2022-12-28 | End: 2023-04-20

## 2022-12-28 RX ORDER — CLOTRIMAZOLE 1 %
CREAM (GRAM) TOPICAL 2 TIMES DAILY
Qty: 60 G | Refills: 1 | Status: SHIPPED | OUTPATIENT
Start: 2022-12-28

## 2022-12-28 RX ORDER — SERTRALINE HYDROCHLORIDE 50 MG/1
50 TABLET, FILM COATED ORAL
COMMUNITY
Start: 2022-11-28 | End: 2023-08-08

## 2022-12-28 RX ORDER — GABAPENTIN 600 MG/1
600 TABLET ORAL 3 TIMES DAILY
Qty: 90 TABLET | Refills: 4 | Status: SHIPPED | OUTPATIENT
Start: 2022-12-28 | End: 2023-04-19

## 2022-12-28 NOTE — PROGRESS NOTES
Patient Name: Michael Pickard   : 1985  MRN: [id number]     Subjective:   Patient ID: Michael Pickard is a 37 y.o. male.    Chief Complaint:   Chief Complaint   Patient presents with    Establish Care     Check foot. Had nail in it about month ago. Was in hospital for infection in it. Stills bothers him.        HPI: HPI    37-year-old male presents to clinic to establish care   Did not have prior PCP     Hypertension  States he was on clonidine in the past and was given a prescription for amlodipine 5 mg which he did not take secondary to not wanting to run out of the medication before he establish with a PCP  Reports long history of difficult to control hypertension    Neuropathy  Bilateral feet was placed on gabapentin 300 mg t.i.d. 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  Reports that his feet are often itchy      Hepatitis-C  Chart records indicate that patient is hepatitis-C positive   Has not had treatment  Was afraid to go to Infectious Disease Clinic secondary to worrying about side effects of medication    Grief  Is on Trileptal 600 mg b.i.d., Seroquel 200 mg nightly, Zoloft 100 mg daily  Reports loss of infant daughter, adult daughter, several family members  Amenable to counseling ;reports that he has a prescriber for these medications and at this time does not wish to switch  No SI or HI but states that his dosages likely need to be adjusted and he does have follow-up today    Smoking  Reports that he  "smokes cigarettes. Is trying to decrease his use of cigarettes and stop smoking.      History of polysubstance use  Reports history of polysubstance use but has been clean for 3 years.   Is prescribed suboxone     ROS:  Review of Systems   Constitutional:  Negative for chills and fever.   HENT:  Negative for sore throat.    Respiratory:  Negative for shortness of breath and wheezing.    Cardiovascular:  Negative for chest pain, palpitations and leg swelling.   Gastrointestinal:  Negative for constipation, diarrhea and heartburn.   Genitourinary:  Negative for frequency and urgency.   Musculoskeletal:  Negative for back pain and myalgias.   Skin:  Negative for itching and rash.   Neurological:  Negative for dizziness, focal weakness and headaches.   Psychiatric/Behavioral:  The patient is nervous/anxious.       History:     Past Medical History:   Diagnosis Date    Anxiety     Depression       Past Surgical History:   Procedure Laterality Date    HERNIA REPAIR       History reviewed. No pertinent family history.   Social History     Tobacco Use    Smoking status: Every Day     Packs/day: 1.00     Types: Cigarettes    Smokeless tobacco: Never   Substance and Sexual Activity    Alcohol use: Not Currently    Drug use: Not Currently     Comment: rehab, not longer taking    Sexual activity: Not on file        Allergies: Review of patient's allergies indicates:  No Known Allergies  Objective:     Vitals:    12/28/22 0907 12/28/22 0913   BP: (!) 147/97 (!) 158/102   Pulse: 83    Temp: 98.1 °F (36.7 °C)    TempSrc: Oral    SpO2: 97%    Weight: 92.5 kg (203 lb 14.4 oz)    Height: 6' 1" (1.854 m)      Body mass index is 26.9 kg/m².     Physical Examination:   Physical Exam  Constitutional:       General: He is not in acute distress.  Eyes:      General: No scleral icterus.     Extraocular Movements: Extraocular movements intact.      Conjunctiva/sclera: Conjunctivae normal.      Pupils: Pupils are equal, round, and reactive to " light.   Cardiovascular:      Rate and Rhythm: Normal rate and regular rhythm.      Heart sounds: No murmur heard.  Pulmonary:      Effort: Pulmonary effort is normal. No respiratory distress.      Breath sounds: No stridor. No wheezing or rhonchi.   Abdominal:      General: Bowel sounds are normal. There is no distension.      Palpations: Abdomen is soft.      Tenderness: There is no abdominal tenderness. There is no guarding.   Musculoskeletal:         General: No swelling. Normal range of motion.      Comments: Left foot without erythema or swelling. Could not palpate any areas of fluctuance in foot.    Skin:     General: Skin is warm and dry.      Coloration: Skin is not jaundiced.      Findings: No rash.      Comments: Tinea pedis bilateral feet    Neurological:      Mental Status: He is alert and oriented to person, place, and time.      Gait: Gait normal.   Psychiatric:         Thought Content: Thought content normal.       Assessment:     1. Foot pain, left    2. Puncture wound of left foot, sequela    3. Wellness examination    4. Hepatitis C antibody positive in blood    5. Hypertension, unspecified type    6. Neuropathy    7. Tinea pedis of both feet    8. Current every day smoker    9. Elevated liver enzymes    10. Recurrent major depressive disorder, in remission    11. Uncontrolled hypertension        Plan:     Problem List Items Addressed This Visit          Neuro    Neuropathy    Overview     Increasing gabapentin to 600 mg t.i.d.         Relevant Medications    gabapentin (NEURONTIN) 600 MG tablet       Psychiatric    Recurrent major depressive disorder, in remission    Overview     List of counseling resources given to patient   Advised follow-up with his behavioral health            Derm    Tinea pedis of both feet    Overview     Patient asked for oral medication   Discussed with patient that these medications are cleared by the liver and he would not be a candidate secondary to elevated liver  enzymes   Clotrimazole 1% cream bilaterally b.i.d. feet  Advised patient to keep feet dry and change socks often         Relevant Medications    clotrimazole (LOTRIMIN) 1 % cream       Cardiac/Vascular    Uncontrolled hypertension    Overview     Starting amlodipine 5 mg as patient is reluctant to take increased dose   Discussed side effects of not controlling blood pressure            GI    Hepatitis C antibody positive in blood    Overview     Repeating CMP today as patient has had elevated liver enzymes   Patient reports that he has been drinking as a coping mechanism but not drinking daily  Ultrasound liver ordered  Hep C quant and referral         Relevant Orders    Hepatitis C Virus Quantitative    US Abdomen Limited    Ambulatory referral/consult to Infectious Disease       Orthopedic    Foot pain, left - Primary    Overview     No discernible etiology found at this time but will refer to Orthopedics and Wound Care as patient has bilateral tinea pedis         Relevant Orders    X-Ray Foot Complete Left    Ambulatory referral/consult to Orthopedics       Other    Wellness examination    Overview     Labs as below         Relevant Orders    CBC Auto Differential    Comprehensive Metabolic Panel    Lipid Panel    T4, Free    TSH    Hemoglobin A1C    Hepatitis C Virus Quantitative    Ambulatory referral/consult to Infectious Disease    HIV 1/2 Ag/Ab (4th Gen)     Other Visit Diagnoses       Puncture wound of left foot, sequela        Relevant Orders    X-Ray Foot Complete Left    Ambulatory referral/consult to Orthopedics    Ambulatory referral/consult to Wound Clinic    Hypertension, unspecified type        Relevant Medications    amLODIPine (NORVASC) 5 MG tablet    Other Relevant Orders    Comprehensive Metabolic Panel    Current every day smoker        Relevant Orders    Ambulatory referral/consult to Smoking Cessation Program    Elevated liver enzymes        Relevant Orders    US Abdomen Limited            Problem List Items Addressed This Visit          Neuro    Neuropathy    Overview     Increasing gabapentin to 600 mg t.i.d.         Relevant Medications    gabapentin (NEURONTIN) 600 MG tablet       Psychiatric    Recurrent major depressive disorder, in remission    Overview     List of counseling resources given to patient   Advised follow-up with his behavioral health            Derm    Tinea pedis of both feet    Overview     Patient asked for oral medication   Discussed with patient that these medications are cleared by the liver and he would not be a candidate secondary to elevated liver enzymes   Clotrimazole 1% cream bilaterally b.i.d. feet  Advised patient to keep feet dry and change socks often         Relevant Medications    clotrimazole (LOTRIMIN) 1 % cream       Cardiac/Vascular    Uncontrolled hypertension    Overview     Starting amlodipine 5 mg as patient is reluctant to take increased dose   Discussed side effects of not controlling blood pressure            GI    Hepatitis C antibody positive in blood    Overview     Repeating CMP today as patient has had elevated liver enzymes   Patient reports that he has been drinking as a coping mechanism but not drinking daily  Ultrasound liver ordered  Hep C quant and referral         Relevant Orders    Hepatitis C Virus Quantitative    US Abdomen Limited    Ambulatory referral/consult to Infectious Disease       Orthopedic    Foot pain, left - Primary    Overview     No discernible etiology found at this time but will refer to Orthopedics and Wound Care as patient has bilateral tinea pedis         Relevant Orders    X-Ray Foot Complete Left    Ambulatory referral/consult to Orthopedics       Other    Wellness examination    Overview     Labs as below         Relevant Orders    CBC Auto Differential    Comprehensive Metabolic Panel    Lipid Panel    T4, Free    TSH    Hemoglobin A1C    Hepatitis C Virus Quantitative    Ambulatory referral/consult to  Infectious Disease    HIV 1/2 Ag/Ab (4th Gen)     Other Visit Diagnoses       Puncture wound of left foot, sequela        Relevant Orders    X-Ray Foot Complete Left    Ambulatory referral/consult to Orthopedics    Ambulatory referral/consult to Wound Clinic    Hypertension, unspecified type        Relevant Medications    amLODIPine (NORVASC) 5 MG tablet    Other Relevant Orders    Comprehensive Metabolic Panel    Current every day smoker        Relevant Orders    Ambulatory referral/consult to Smoking Cessation Program    Elevated liver enzymes        Relevant Orders    US Abdomen Limited           Follow up in about 2 weeks (around 1/11/2023) for Hypertension, BP check, lab results.

## 2023-01-13 ENCOUNTER — HOSPITAL ENCOUNTER (OUTPATIENT)
Dept: RADIOLOGY | Facility: HOSPITAL | Age: 38
Discharge: HOME | End: 2023-01-13
Attending: STUDENT IN AN ORGANIZED HEALTH CARE EDUCATION/TRAINING PROGRAM
Payer: MEDICAID

## 2023-01-13 DIAGNOSIS — R76.8 HEPATITIS C ANTIBODY POSITIVE IN BLOOD: ICD-10-CM

## 2023-01-13 DIAGNOSIS — R74.8 ELEVATED LIVER ENZYMES: ICD-10-CM

## 2023-01-13 PROCEDURE — 76705 ECHO EXAM OF ABDOMEN: CPT | Mod: TC

## 2023-01-19 ENCOUNTER — HOSPITAL ENCOUNTER (OUTPATIENT)
Dept: RADIOLOGY | Facility: HOSPITAL | Age: 38
Discharge: HOME | End: 2023-01-19
Attending: STUDENT IN AN ORGANIZED HEALTH CARE EDUCATION/TRAINING PROGRAM
Payer: MEDICAID

## 2023-01-19 ENCOUNTER — OFFICE VISIT (OUTPATIENT)
Dept: FAMILY MEDICINE | Facility: CLINIC | Age: 38
End: 2023-01-19
Payer: MEDICAID

## 2023-01-19 VITALS
WEIGHT: 210 LBS | HEART RATE: 71 BPM | HEIGHT: 73 IN | SYSTOLIC BLOOD PRESSURE: 152 MMHG | BODY MASS INDEX: 27.83 KG/M2 | OXYGEN SATURATION: 97 % | TEMPERATURE: 98 F | RESPIRATION RATE: 20 BRPM | DIASTOLIC BLOOD PRESSURE: 100 MMHG

## 2023-01-19 DIAGNOSIS — R76.8 HEPATITIS C ANTIBODY POSITIVE IN BLOOD: Primary | ICD-10-CM

## 2023-01-19 DIAGNOSIS — G62.9 NEUROPATHY: ICD-10-CM

## 2023-01-19 DIAGNOSIS — I10 UNCONTROLLED HYPERTENSION: ICD-10-CM

## 2023-01-19 DIAGNOSIS — M25.521 RIGHT ELBOW PAIN: ICD-10-CM

## 2023-01-19 DIAGNOSIS — R53.83 FATIGUE, UNSPECIFIED TYPE: ICD-10-CM

## 2023-01-19 DIAGNOSIS — F33.40 RECURRENT MAJOR DEPRESSIVE DISORDER, IN REMISSION: ICD-10-CM

## 2023-01-19 DIAGNOSIS — M25.50 ARTHRALGIA, UNSPECIFIED JOINT: ICD-10-CM

## 2023-01-19 PROCEDURE — 99214 OFFICE O/P EST MOD 30 MIN: CPT | Mod: PBBFAC,PN | Performed by: STUDENT IN AN ORGANIZED HEALTH CARE EDUCATION/TRAINING PROGRAM

## 2023-01-19 PROCEDURE — 3008F BODY MASS INDEX DOCD: CPT | Mod: CPTII,,, | Performed by: STUDENT IN AN ORGANIZED HEALTH CARE EDUCATION/TRAINING PROGRAM

## 2023-01-19 PROCEDURE — 3077F SYST BP >= 140 MM HG: CPT | Mod: CPTII,,, | Performed by: STUDENT IN AN ORGANIZED HEALTH CARE EDUCATION/TRAINING PROGRAM

## 2023-01-19 PROCEDURE — 1159F MED LIST DOCD IN RCRD: CPT | Mod: CPTII,,, | Performed by: STUDENT IN AN ORGANIZED HEALTH CARE EDUCATION/TRAINING PROGRAM

## 2023-01-19 PROCEDURE — 84550 ASSAY OF BLOOD/URIC ACID: CPT | Performed by: STUDENT IN AN ORGANIZED HEALTH CARE EDUCATION/TRAINING PROGRAM

## 2023-01-19 PROCEDURE — 36415 COLL VENOUS BLD VENIPUNCTURE: CPT | Performed by: STUDENT IN AN ORGANIZED HEALTH CARE EDUCATION/TRAINING PROGRAM

## 2023-01-19 PROCEDURE — 3077F PR MOST RECENT SYSTOLIC BLOOD PRESSURE >= 140 MM HG: ICD-10-PCS | Mod: CPTII,,, | Performed by: STUDENT IN AN ORGANIZED HEALTH CARE EDUCATION/TRAINING PROGRAM

## 2023-01-19 PROCEDURE — 3080F DIAST BP >= 90 MM HG: CPT | Mod: CPTII,,, | Performed by: STUDENT IN AN ORGANIZED HEALTH CARE EDUCATION/TRAINING PROGRAM

## 2023-01-19 PROCEDURE — 99214 OFFICE O/P EST MOD 30 MIN: CPT | Mod: S$PBB,,, | Performed by: STUDENT IN AN ORGANIZED HEALTH CARE EDUCATION/TRAINING PROGRAM

## 2023-01-19 PROCEDURE — 3080F PR MOST RECENT DIASTOLIC BLOOD PRESSURE >= 90 MM HG: ICD-10-PCS | Mod: CPTII,,, | Performed by: STUDENT IN AN ORGANIZED HEALTH CARE EDUCATION/TRAINING PROGRAM

## 2023-01-19 PROCEDURE — 99214 PR OFFICE/OUTPT VISIT, EST, LEVL IV, 30-39 MIN: ICD-10-PCS | Mod: S$PBB,,, | Performed by: STUDENT IN AN ORGANIZED HEALTH CARE EDUCATION/TRAINING PROGRAM

## 2023-01-19 PROCEDURE — 73080 X-RAY EXAM OF ELBOW: CPT | Mod: TC,PN,RT

## 2023-01-19 PROCEDURE — 1159F PR MEDICATION LIST DOCUMENTED IN MEDICAL RECORD: ICD-10-PCS | Mod: CPTII,,, | Performed by: STUDENT IN AN ORGANIZED HEALTH CARE EDUCATION/TRAINING PROGRAM

## 2023-01-19 PROCEDURE — 82607 VITAMIN B-12: CPT | Performed by: STUDENT IN AN ORGANIZED HEALTH CARE EDUCATION/TRAINING PROGRAM

## 2023-01-19 PROCEDURE — 3008F PR BODY MASS INDEX (BMI) DOCUMENTED: ICD-10-PCS | Mod: CPTII,,, | Performed by: STUDENT IN AN ORGANIZED HEALTH CARE EDUCATION/TRAINING PROGRAM

## 2023-01-19 NOTE — PROGRESS NOTES
Patient Name: Michael Pickard   : 1985  MRN: [id number]     Subjective:   Patient ID: Michael Pickard is a 37 y.o. male.    Chief Complaint:   Chief Complaint   Patient presents with    Follow-up        HPI: HPI    37-year-old male presents to clinic for follow up  Did not have prior PCP ; missed follow up visit after establishing care.     Hypertension  States he was on clonidine in the past and was given a prescription for amlodipine 5 mg which he did not take secondary to not wanting to run out of the medication before he establish with a PCP  Reports long history of difficult to control hypertension  Added again at first visit but states he has not been taking  /100    Neuropathy  Bilateral feet was placed on gabapentin 300 mg t.i.d. in the hospital and increased to 600 mg TID at first visit.  Not taking    Advised first visit to keep feet dry and given Clotrimazole to place on feed      Hepatitis-C  Chart records indicate that patient is hepatitis-C positive   Has not had treatment  Was afraid to go to [facility] secondary to worrying about side effects of medication  US shows hepatic steatosis. Reviewed with patient.     Grief  Is on Trileptal 600 mg b.i.d., Seroquel 200 mg nightly, Zoloft 150 mg daily  Reports loss of infant daughter, adult daughter, several family members  Amenable to counseling ;reports that he has a prescriber for these medications and at this time does not wish to switch  No SI or HI but states that his dosages likely need to be adjusted and he does have follow-up   Also complains of fatigue     Erectile Dysfunction  Reports that since Zoloft increased he has been having ED with problems achieving erections.     Smoking  Reports that he smokes cigarettes. Is trying to decrease his use of cigarettes and stop smoking.      History of polysubstance use  Reports history of polysubstance use but has been clean for 3 years.   Is prescribed suboxone       Right Elbow  "pain  Never been diagnosed with gout. States elbow hurts to lean on things. NO obvious deformity     ROS:  Review of Systems   Constitutional:  Negative for chills and fever.   HENT:  Negative for sore throat.    Respiratory:  Negative for shortness of breath and wheezing.    Cardiovascular:  Negative for chest pain, palpitations and leg swelling.   Gastrointestinal:  Negative for constipation, diarrhea and heartburn.   Genitourinary:  Negative for frequency and urgency.   Musculoskeletal:  Positive for joint pain. Negative for back pain and myalgias.   Skin:  Negative for itching and rash.   Neurological:  Negative for dizziness, focal weakness and headaches.   Psychiatric/Behavioral:  The patient is nervous/anxious.       History:     Past Medical History:   Diagnosis Date    Anxiety     Depression       Past Surgical History:   Procedure Laterality Date    HERNIA REPAIR       History reviewed. No pertinent family history.   Social History     Tobacco Use    Smoking status: Every Day     Packs/day: 1.00     Types: Cigarettes     Passive exposure: Never    Smokeless tobacco: Never   Substance and Sexual Activity    Alcohol use: Not Currently    Drug use: Not Currently     Comment: rehab, not longer taking    Sexual activity: Yes     Partners: Female        Allergies: Review of patient's allergies indicates:  No Known Allergies  Objective:     Vitals:    01/19/23 0840 01/19/23 0845   BP: (!) 159/113 (!) 152/100   Pulse: 71    Resp: 20    Temp: 97.9 °F (36.6 °C)    SpO2: 97%    Weight: 95.3 kg (210 lb)    Height: 6' 1" (1.854 m)    PainSc:   6    PainLoc: Elbow      Body mass index is 27.71 kg/m².     Physical Examination:   Physical Exam  Constitutional:       General: He is not in acute distress.  Eyes:      General: No scleral icterus.     Extraocular Movements: Extraocular movements intact.      Conjunctiva/sclera: Conjunctivae normal.      Pupils: Pupils are equal, round, and reactive to light.   Cardiovascular: "      Rate and Rhythm: Normal rate and regular rhythm.      Heart sounds: No murmur heard.  Pulmonary:      Effort: Pulmonary effort is normal. No respiratory distress.      Breath sounds: No stridor. No wheezing or rhonchi.   Abdominal:      General: Bowel sounds are normal. There is no distension.      Palpations: Abdomen is soft.      Tenderness: There is no abdominal tenderness. There is no guarding.   Musculoskeletal:         General: No swelling. Normal range of motion.      Comments: Full ROM right elbow without obvious deformity or tenderness to palpation   Skin:     General: Skin is warm and dry.      Coloration: Skin is not jaundiced.      Findings: No rash.      Comments: Tinea pedis bilateral feet    Neurological:      Mental Status: He is alert and oriented to person, place, and time.      Gait: Gait normal.   Psychiatric:         Thought Content: Thought content normal.       Assessment:     1. Hepatitis C antibody positive in blood    2. Fatigue, unspecified type    3. Arthralgia, unspecified joint    4. Right elbow pain    5. Recurrent major depressive disorder, in remission    6. Neuropathy    7. Uncontrolled hypertension        Plan:     Problem List Items Addressed This Visit          Neuro    Neuropathy    Overview      gabapentin to 600 mg t.i.d.  B12 secondary to neuropathy and fatigue but can also be grief/medications            Psychiatric    Recurrent major depressive disorder, in remission    Overview     List of counseling resources given to patient   Advised follow-up with his behavioral health  Encouraged patient to ask about wellbutrin or Trintellix as patient with ED likely 2/2 Zoloft            Cardiac/Vascular    Uncontrolled hypertension    Overview     Starting amlodipine 5 mg again as patient is reluctant to take increased dose   Discussed side effects of not controlling blood pressure            GI    Hepatitis C antibody positive in blood - Primary    Overview     Repeating CMP  today as patient has had elevated liver enzymes   Patient reports that he has been drinking as a coping mechanism but not drinking daily  Ultrasound liver with hepatic steatosis  Hep C quant and referral to ID          Relevant Orders    Hepatitis C Virus Quantitative       Orthopedic    Right elbow pain    Overview     Uric acid and Xray today          Relevant Orders    X-Ray Elbow Complete Right (Completed)     Other Visit Diagnoses       Fatigue, unspecified type        Relevant Orders    Vitamin B12    Arthralgia, unspecified joint        Relevant Orders    Uric Acid           Problem List Items Addressed This Visit          Neuro    Neuropathy    Overview      gabapentin to 600 mg t.i.d.  B12 secondary to neuropathy and fatigue but can also be grief/medications            Psychiatric    Recurrent major depressive disorder, in remission    Overview     List of counseling resources given to patient   Advised follow-up with his behavioral health  Encouraged patient to ask about wellbutrin or Trintellix as patient with ED likely 2/2 Zoloft            Cardiac/Vascular    Uncontrolled hypertension    Overview     Starting amlodipine 5 mg again as patient is reluctant to take increased dose   Discussed side effects of not controlling blood pressure            GI    Hepatitis C antibody positive in blood - Primary    Overview     Repeating CMP today as patient has had elevated liver enzymes   Patient reports that he has been drinking as a coping mechanism but not drinking daily  Ultrasound liver with hepatic steatosis  Hep C quant and referral to ID          Relevant Orders    Hepatitis C Virus Quantitative       Orthopedic    Right elbow pain    Overview     Uric acid and Xray today          Relevant Orders    X-Ray Elbow Complete Right (Completed)     Other Visit Diagnoses       Fatigue, unspecified type        Relevant Orders    Vitamin B12    Arthralgia, unspecified joint        Relevant Orders    Uric Acid            Problem List Items Addressed This Visit          Neuro    Neuropathy    Overview      gabapentin to 600 mg t.i.d.  B12 secondary to neuropathy and fatigue but can also be grief/medications            Psychiatric    Recurrent major depressive disorder, in remission    Overview     List of counseling resources given to patient   Advised follow-up with his behavioral health  Encouraged patient to ask about wellbutrin or Trintellix as patient with ED likely 2/2 Zoloft            Cardiac/Vascular    Uncontrolled hypertension    Overview     Starting amlodipine 5 mg again as patient is reluctant to take increased dose   Discussed side effects of not controlling blood pressure            GI    Hepatitis C antibody positive in blood - Primary    Overview     Repeating CMP today as patient has had elevated liver enzymes   Patient reports that he has been drinking as a coping mechanism but not drinking daily  Ultrasound liver with hepatic steatosis  Hep C quant and referral to ID          Relevant Orders    Hepatitis C Virus Quantitative       Orthopedic    Right elbow pain    Overview     Uric acid and Xray today          Relevant Orders    X-Ray Elbow Complete Right (Completed)     Other Visit Diagnoses       Fatigue, unspecified type        Relevant Orders    Vitamin B12    Arthralgia, unspecified joint        Relevant Orders    Uric Acid           Follow up in about 3 months (around 4/19/2023) for cholesterol.

## 2023-01-20 ENCOUNTER — TELEPHONE (OUTPATIENT)
Dept: FAMILY MEDICINE | Facility: CLINIC | Age: 38
End: 2023-01-20
Payer: MEDICAID

## 2023-01-20 LAB
URATE SERPL-MCNC: 4.4 MG/DL (ref 3.5–7.2)
VIT B12 SERPL-MCNC: 628 PG/ML (ref 213–816)

## 2023-01-20 RX ORDER — NAPROXEN 500 MG/1
500 TABLET ORAL 2 TIMES DAILY
Qty: 60 TABLET | Refills: 3 | Status: SHIPPED | OUTPATIENT
Start: 2023-01-20 | End: 2023-10-25

## 2023-01-24 ENCOUNTER — TELEPHONE (OUTPATIENT)
Dept: FAMILY MEDICINE | Facility: CLINIC | Age: 38
End: 2023-01-24
Payer: MEDICAID

## 2023-02-17 NOTE — TELEPHONE ENCOUNTER
Patient was contact unable to leave message , Inform  that a letter of contact need to be send out.

## 2023-04-03 PROBLEM — Z00.00 WELLNESS EXAMINATION: Status: RESOLVED | Noted: 2022-12-28 | Resolved: 2023-04-03

## 2023-04-19 ENCOUNTER — OFFICE VISIT (OUTPATIENT)
Dept: FAMILY MEDICINE | Facility: CLINIC | Age: 38
End: 2023-04-19
Payer: MEDICAID

## 2023-04-19 VITALS
RESPIRATION RATE: 20 BRPM | DIASTOLIC BLOOD PRESSURE: 116 MMHG | HEART RATE: 81 BPM | WEIGHT: 206.69 LBS | BODY MASS INDEX: 27.39 KG/M2 | SYSTOLIC BLOOD PRESSURE: 180 MMHG | TEMPERATURE: 99 F | HEIGHT: 73 IN | OXYGEN SATURATION: 96 %

## 2023-04-19 DIAGNOSIS — F17.210 HEAVY CIGARETTE SMOKER (20-39 PER DAY): ICD-10-CM

## 2023-04-19 DIAGNOSIS — I10 UNCONTROLLED HYPERTENSION: ICD-10-CM

## 2023-04-19 DIAGNOSIS — R76.8 HEPATITIS C ANTIBODY POSITIVE IN BLOOD: ICD-10-CM

## 2023-04-19 DIAGNOSIS — R76.8 HEPATITIS C ANTIBODY TEST POSITIVE: Primary | ICD-10-CM

## 2023-04-19 DIAGNOSIS — I73.9 CLAUDICATION OF RIGHT LOWER EXTREMITY: ICD-10-CM

## 2023-04-19 DIAGNOSIS — E78.49 OTHER HYPERLIPIDEMIA: ICD-10-CM

## 2023-04-19 DIAGNOSIS — F17.210 HEAVY SMOKER (MORE THAN 20 CIGARETTES PER DAY): ICD-10-CM

## 2023-04-19 DIAGNOSIS — G62.9 NEUROPATHY: ICD-10-CM

## 2023-04-19 DIAGNOSIS — E78.5 HYPERLIPIDEMIA, UNSPECIFIED HYPERLIPIDEMIA TYPE: ICD-10-CM

## 2023-04-19 PROCEDURE — 99406 BEHAV CHNG SMOKING 3-10 MIN: CPT | Mod: S$PBB,,, | Performed by: STUDENT IN AN ORGANIZED HEALTH CARE EDUCATION/TRAINING PROGRAM

## 2023-04-19 PROCEDURE — 99214 OFFICE O/P EST MOD 30 MIN: CPT | Mod: PBBFAC,PN | Performed by: STUDENT IN AN ORGANIZED HEALTH CARE EDUCATION/TRAINING PROGRAM

## 2023-04-19 PROCEDURE — 1159F PR MEDICATION LIST DOCUMENTED IN MEDICAL RECORD: ICD-10-PCS | Mod: CPTII,,, | Performed by: STUDENT IN AN ORGANIZED HEALTH CARE EDUCATION/TRAINING PROGRAM

## 2023-04-19 PROCEDURE — 99406 PR TOBACCO USE CESSATION INTERMEDIATE 3-10 MINUTES: ICD-10-PCS | Mod: S$PBB,,, | Performed by: STUDENT IN AN ORGANIZED HEALTH CARE EDUCATION/TRAINING PROGRAM

## 2023-04-19 PROCEDURE — 3080F DIAST BP >= 90 MM HG: CPT | Mod: CPTII,,, | Performed by: STUDENT IN AN ORGANIZED HEALTH CARE EDUCATION/TRAINING PROGRAM

## 2023-04-19 PROCEDURE — 3080F PR MOST RECENT DIASTOLIC BLOOD PRESSURE >= 90 MM HG: ICD-10-PCS | Mod: CPTII,,, | Performed by: STUDENT IN AN ORGANIZED HEALTH CARE EDUCATION/TRAINING PROGRAM

## 2023-04-19 PROCEDURE — 3008F BODY MASS INDEX DOCD: CPT | Mod: CPTII,,, | Performed by: STUDENT IN AN ORGANIZED HEALTH CARE EDUCATION/TRAINING PROGRAM

## 2023-04-19 PROCEDURE — 3077F PR MOST RECENT SYSTOLIC BLOOD PRESSURE >= 140 MM HG: ICD-10-PCS | Mod: CPTII,,, | Performed by: STUDENT IN AN ORGANIZED HEALTH CARE EDUCATION/TRAINING PROGRAM

## 2023-04-19 PROCEDURE — 99214 OFFICE O/P EST MOD 30 MIN: CPT | Mod: S$PBB,25,, | Performed by: STUDENT IN AN ORGANIZED HEALTH CARE EDUCATION/TRAINING PROGRAM

## 2023-04-19 PROCEDURE — 1159F MED LIST DOCD IN RCRD: CPT | Mod: CPTII,,, | Performed by: STUDENT IN AN ORGANIZED HEALTH CARE EDUCATION/TRAINING PROGRAM

## 2023-04-19 PROCEDURE — 87522 HEPATITIS C REVRS TRNSCRPJ: CPT | Mod: 90 | Performed by: STUDENT IN AN ORGANIZED HEALTH CARE EDUCATION/TRAINING PROGRAM

## 2023-04-19 PROCEDURE — 36415 COLL VENOUS BLD VENIPUNCTURE: CPT | Performed by: STUDENT IN AN ORGANIZED HEALTH CARE EDUCATION/TRAINING PROGRAM

## 2023-04-19 PROCEDURE — 3077F SYST BP >= 140 MM HG: CPT | Mod: CPTII,,, | Performed by: STUDENT IN AN ORGANIZED HEALTH CARE EDUCATION/TRAINING PROGRAM

## 2023-04-19 PROCEDURE — 3008F PR BODY MASS INDEX (BMI) DOCUMENTED: ICD-10-PCS | Mod: CPTII,,, | Performed by: STUDENT IN AN ORGANIZED HEALTH CARE EDUCATION/TRAINING PROGRAM

## 2023-04-19 PROCEDURE — 99214 PR OFFICE/OUTPT VISIT, EST, LEVL IV, 30-39 MIN: ICD-10-PCS | Mod: S$PBB,25,, | Performed by: STUDENT IN AN ORGANIZED HEALTH CARE EDUCATION/TRAINING PROGRAM

## 2023-04-19 RX ORDER — IBUPROFEN 200 MG
1 TABLET ORAL DAILY
Qty: 30 PATCH | Refills: 0 | Status: SHIPPED | OUTPATIENT
Start: 2023-04-19 | End: 2023-10-25

## 2023-04-19 RX ORDER — MICONAZOLE NITRATE 2 %
4 CREAM (GRAM) TOPICAL
Qty: 100 EACH | Refills: 3 | Status: SHIPPED | OUTPATIENT
Start: 2023-04-19 | End: 2023-10-25

## 2023-04-19 RX ORDER — NICOTINE 7MG/24HR
1 PATCH, TRANSDERMAL 24 HOURS TRANSDERMAL DAILY
Qty: 30 PATCH | Refills: 0 | Status: SHIPPED | OUTPATIENT
Start: 2023-06-18 | End: 2023-10-25

## 2023-04-19 RX ORDER — IBUPROFEN 200 MG
1 TABLET ORAL DAILY
Qty: 30 PATCH | Refills: 0 | Status: SHIPPED | OUTPATIENT
Start: 2023-05-19 | End: 2023-10-25

## 2023-04-19 RX ORDER — PRAVASTATIN SODIUM 20 MG/1
20 TABLET ORAL DAILY
Qty: 90 TABLET | Refills: 3 | Status: SHIPPED | OUTPATIENT
Start: 2023-04-19 | End: 2024-04-18

## 2023-04-19 NOTE — PROGRESS NOTES
Patient Name: Michael Pickard   : 1985  MRN: [id number]     Subjective:   Patient ID: Michael Pickard is a 37 y.o. male.    Chief Complaint:   Chief Complaint   Patient presents with    Leg Pain        HPI: HPI    37-year-old male presents to clinic for follow up of new issue of leg pain and previous issue of smoking  Did not have prior PCP ; missed follow up visit after establishing care.     Hypertension  Patient states he has been taking amlodipine 5 mg daily blood pressure 180/116   Will need to increase medication    Neuropathy  Last visit patient was started on 600 mg gabapentin 3 times a day  Patient asking to increase is also complaining of tingling numbness in his legs in his concerned that the blood flow in his leg may not be the best      Hepatitis-C  Chart records indicate that patient is hepatitis-C positive   Has not had treatment  Was afraid to go to [facility] Disease Clinic secondary to worrying about side effects of medication  US shows hepatic steatosis. Reviewed with patient.   Patient did not have RNA quant completed    Grief  Is on Trileptal 600 mg b.i.d., Seroquel 200 mg nightly, Zoloft 150 mg daily  Reports loss of infant daughter, adult daughter, several family members  Amenable to counseling ;reports that he has a prescriber for these medications and at this time does not wish to switch  No SI or HI but states that his dosages likely need to be adjusted and he does have follow-up   Also complains of fatigue   Patient given resources last visit and states that his fiancee was supposed to be setting up counseling    Erectile Dysfunction  Reports that since Zoloft increased he has been having ED with problems achieving erections.     Smoking  Reports smoking 3-4 packs of cigarettes per day and would like help quitting    History of polysubstance use  Reports history of polysubstance use but has been clean for 3 years.   Is prescribed suboxone         ROS:  Review of Systems   Constitutional:   "Negative for chills and fever.   HENT:  Negative for sore throat.    Respiratory:  Negative for shortness of breath and wheezing.    Cardiovascular:  Negative for chest pain, palpitations and leg swelling.   Gastrointestinal:  Negative for constipation, diarrhea and heartburn.   Genitourinary:  Negative for frequency and urgency.   Musculoskeletal:  Positive for joint pain. Negative for back pain and myalgias.   Skin:  Negative for itching and rash.   Neurological:  Negative for dizziness, focal weakness and headaches.   Psychiatric/Behavioral:  The patient is nervous/anxious.       History:     Past Medical History:   Diagnosis Date    Anxiety     Depression       Past Surgical History:   Procedure Laterality Date    HERNIA REPAIR       History reviewed. No pertinent family history.   Social History     Tobacco Use    Smoking status: Every Day     Packs/day: 1.00     Years: 20.00     Pack years: 20.00     Types: Cigarettes     Passive exposure: Never    Smokeless tobacco: Never   Substance and Sexual Activity    Alcohol use: Not Currently    Drug use: Not Currently     Comment: rehab, not longer taking    Sexual activity: Yes     Partners: Female        Allergies: Review of patient's allergies indicates:  No Known Allergies  Objective:     Vitals:    04/19/23 1032 04/19/23 1038   BP: (!) 151/118 (!) 180/116   Pulse: 81    Resp: 20    Temp: 98.7 °F (37.1 °C)    SpO2: 96%    Weight: 93.8 kg (206 lb 11.2 oz)    Height: 6' 1" (1.854 m)    PainSc:   8    PainLoc: Leg      Body mass index is 27.27 kg/m².     Physical Examination:   Physical Exam  Constitutional:       General: He is not in acute distress.  Eyes:      General: No scleral icterus.     Extraocular Movements: Extraocular movements intact.      Conjunctiva/sclera: Conjunctivae normal.      Pupils: Pupils are equal, round, and reactive to light.   Cardiovascular:      Rate and Rhythm: Normal rate and regular rhythm.      Heart sounds: No murmur " heard.  Pulmonary:      Effort: Pulmonary effort is normal. No respiratory distress.      Breath sounds: No stridor. No wheezing or rhonchi.   Abdominal:      General: Bowel sounds are normal. There is no distension.      Palpations: Abdomen is soft.      Tenderness: There is no abdominal tenderness. There is no guarding.   Musculoskeletal:         General: No swelling. Normal range of motion.      Comments: Full ROM right elbow without obvious deformity or tenderness to palpation   Skin:     General: Skin is warm and dry.      Coloration: Skin is not jaundiced.      Findings: No rash.      Comments: Tinea pedis bilateral feet    Neurological:      Mental Status: He is alert and oriented to person, place, and time.      Gait: Gait normal.   Psychiatric:         Thought Content: Thought content normal.       Assessment:     1. Hepatitis C antibody test positive    2. Neuropathy    3. Hyperlipidemia, unspecified hyperlipidemia type    4. Heavy cigarette smoker (20-39 per day)    5. Uncontrolled hypertension    6. Heavy smoker (more than 20 cigarettes per day)    7. Other hyperlipidemia    8. Hepatitis C antibody positive in blood        Plan:     Problem List Items Addressed This Visit          Neuro    Neuropathy    Overview     Gabapentin 800 mg t.i.d.  B12 normal            Relevant Medications    gabapentin (NEURONTIN) 800 MG tablet       Cardiac/Vascular    Uncontrolled hypertension    Overview     Patient now amenable to increasing amlodipine to 10 mg per day  Adding new prescription will likely need to add losartan           Other hyperlipidemia    Overview     Discussed cholesterol results with patient will be adding pravastatin 20 mg daily   Not adding Crestor or atorvastatin at this time secondary to patient likely undergoing hepatitis-C treatment              GI    Hepatitis C antibody positive in blood    Overview     RNA quant today patient has been referred to ID               Other    Heavy smoker (more  than 20 cigarettes per day)    Overview     Spent 7 minutes discussing smoking cessation with patient will prescribe nicotine patches 21, 14, 7 also Nicorette gum as patient is currently smoking 3-4 packs per day   Also referring patient to smoking cessation  Discussed that quitting smoking can help decrease blood pressure            Other Visit Diagnoses       Hepatitis C antibody test positive    -  Primary    Relevant Orders    Hepatitis C Virus Quantitative    Hyperlipidemia, unspecified hyperlipidemia type        Relevant Medications    pravastatin (PRAVACHOL) 20 MG tablet    Heavy cigarette smoker (20-39 per day)        Relevant Medications    nicotine (NICODERM CQ) 21 mg/24 hr    nicotine (NICODERM CQ) 14 mg/24 hr (Start on 5/19/2023)    nicotine (NICODERM CQ) 7 mg/24 hr (Start on 6/18/2023)    nicotine, polacrilex, (NICORETTE) 2 mg Gum    Other Relevant Orders    Ambulatory referral/consult to Smoking Cessation Program           Problem List Items Addressed This Visit          Neuro    Neuropathy    Overview     Gabapentin 800 mg t.i.d.  B12 normal            Relevant Medications    gabapentin (NEURONTIN) 800 MG tablet       Cardiac/Vascular    Uncontrolled hypertension    Overview     Patient now amenable to increasing amlodipine to 10 mg per day  Adding new prescription will likely need to add losartan           Other hyperlipidemia    Overview     Discussed cholesterol results with patient will be adding pravastatin 20 mg daily   Not adding Crestor or atorvastatin at this time secondary to patient likely undergoing hepatitis-C treatment              GI    Hepatitis C antibody positive in blood    Overview     RNA quant today patient has been referred to ID               Other    Heavy smoker (more than 20 cigarettes per day)    Overview     Spent 7 minutes discussing smoking cessation with patient will prescribe nicotine patches 21, 14, 7 also Nicorette gum as patient is currently smoking 3-4 packs per  day   Also referring patient to smoking cessation  Discussed that quitting smoking can help decrease blood pressure            Other Visit Diagnoses       Hepatitis C antibody test positive    -  Primary    Relevant Orders    Hepatitis C Virus Quantitative    Hyperlipidemia, unspecified hyperlipidemia type        Relevant Medications    pravastatin (PRAVACHOL) 20 MG tablet    Heavy cigarette smoker (20-39 per day)        Relevant Medications    nicotine (NICODERM CQ) 21 mg/24 hr    nicotine (NICODERM CQ) 14 mg/24 hr (Start on 5/19/2023)    nicotine (NICODERM CQ) 7 mg/24 hr (Start on 6/18/2023)    nicotine, polacrilex, (NICORETTE) 2 mg Gum    Other Relevant Orders    Ambulatory referral/consult to Smoking Cessation Program           Problem List Items Addressed This Visit          Neuro    Neuropathy    Overview     Gabapentin 800 mg t.i.d.  B12 normal            Relevant Medications    gabapentin (NEURONTIN) 800 MG tablet       Cardiac/Vascular    Uncontrolled hypertension    Overview     Patient now amenable to increasing amlodipine to 10 mg per day  Adding new prescription will likely need to add losartan           Other hyperlipidemia    Overview     Discussed cholesterol results with patient will be adding pravastatin 20 mg daily   Not adding Crestor or atorvastatin at this time secondary to patient likely undergoing hepatitis-C treatment              GI    Hepatitis C antibody positive in blood    Overview     RNA quant today patient has been referred to ID               Other    Heavy smoker (more than 20 cigarettes per day)    Overview     Spent 7 minutes discussing smoking cessation with patient will prescribe nicotine patches 21, 14, 7 also Nicorette gum as patient is currently smoking 3-4 packs per day   Also referring patient to smoking cessation  Discussed that quitting smoking can help decrease blood pressure            Other Visit Diagnoses       Hepatitis C antibody test positive    -  Primary     Relevant Orders    Hepatitis C Virus Quantitative    Hyperlipidemia, unspecified hyperlipidemia type        Relevant Medications    pravastatin (PRAVACHOL) 20 MG tablet    Heavy cigarette smoker (20-39 per day)        Relevant Medications    nicotine (NICODERM CQ) 21 mg/24 hr    nicotine (NICODERM CQ) 14 mg/24 hr (Start on 5/19/2023)    nicotine (NICODERM CQ) 7 mg/24 hr (Start on 6/18/2023)    nicotine, polacrilex, (NICORETTE) 2 mg Gum    Other Relevant Orders    Ambulatory referral/consult to Smoking Cessation Program           Follow up in about 1 month (around 5/19/2023) for lab results.

## 2023-04-20 PROBLEM — F17.210 HEAVY SMOKER (MORE THAN 20 CIGARETTES PER DAY): Status: ACTIVE | Noted: 2023-04-20

## 2023-04-20 PROBLEM — E78.49 OTHER HYPERLIPIDEMIA: Status: ACTIVE | Noted: 2023-04-20

## 2023-04-20 PROBLEM — I73.9 CLAUDICATION OF RIGHT LOWER EXTREMITY: Status: ACTIVE | Noted: 2023-04-20

## 2023-04-20 RX ORDER — AMLODIPINE BESYLATE 10 MG/1
10 TABLET ORAL DAILY
Qty: 30 TABLET | Refills: 11 | Status: SHIPPED | OUTPATIENT
Start: 2023-04-20 | End: 2023-08-08

## 2023-04-20 RX ORDER — GABAPENTIN 800 MG/1
800 TABLET ORAL 3 TIMES DAILY
Qty: 90 TABLET | Refills: 11 | Status: SHIPPED | OUTPATIENT
Start: 2023-04-20 | End: 2023-10-25

## 2023-04-21 LAB — HCV RNA SERPL NAA+PROBE-ACNC: ABNORMAL IU/ML

## 2023-08-08 ENCOUNTER — OFFICE VISIT (OUTPATIENT)
Dept: FAMILY MEDICINE | Facility: CLINIC | Age: 38
End: 2023-08-08
Payer: MEDICAID

## 2023-08-08 ENCOUNTER — HOSPITAL ENCOUNTER (OUTPATIENT)
Dept: RADIOLOGY | Facility: HOSPITAL | Age: 38
Discharge: HOME | End: 2023-08-08
Attending: STUDENT IN AN ORGANIZED HEALTH CARE EDUCATION/TRAINING PROGRAM
Payer: MEDICAID

## 2023-08-08 VITALS
TEMPERATURE: 98 F | WEIGHT: 208.13 LBS | HEART RATE: 75 BPM | RESPIRATION RATE: 20 BRPM | BODY MASS INDEX: 27.59 KG/M2 | SYSTOLIC BLOOD PRESSURE: 151 MMHG | DIASTOLIC BLOOD PRESSURE: 98 MMHG | HEIGHT: 73 IN | OXYGEN SATURATION: 99 %

## 2023-08-08 DIAGNOSIS — C61 PROSTATE CANCER: ICD-10-CM

## 2023-08-08 DIAGNOSIS — F17.210 HEAVY SMOKER (MORE THAN 20 CIGARETTES PER DAY): ICD-10-CM

## 2023-08-08 DIAGNOSIS — Z00.00 WELLNESS EXAMINATION: Primary | ICD-10-CM

## 2023-08-08 DIAGNOSIS — R11.0 NAUSEA: ICD-10-CM

## 2023-08-08 DIAGNOSIS — K59.00 CONSTIPATION, UNSPECIFIED CONSTIPATION TYPE: ICD-10-CM

## 2023-08-08 DIAGNOSIS — I10 UNCONTROLLED HYPERTENSION: ICD-10-CM

## 2023-08-08 DIAGNOSIS — R76.8 HEPATITIS C ANTIBODY POSITIVE IN BLOOD: ICD-10-CM

## 2023-08-08 DIAGNOSIS — E78.49 OTHER HYPERLIPIDEMIA: ICD-10-CM

## 2023-08-08 DIAGNOSIS — G62.9 NEUROPATHY: ICD-10-CM

## 2023-08-08 LAB
ALBUMIN SERPL-MCNC: 4.1 G/DL (ref 3.5–5)
ALBUMIN/GLOB SERPL: 1.2 RATIO (ref 1.1–2)
ALP SERPL-CCNC: 71 UNIT/L (ref 40–150)
ALT SERPL-CCNC: 36 UNIT/L (ref 0–55)
APPEARANCE UR: CLEAR
AST SERPL-CCNC: 26 UNIT/L (ref 5–34)
BACTERIA #/AREA URNS AUTO: ABNORMAL /HPF
BASOPHILS # BLD AUTO: 0.06 X10(3)/MCL
BASOPHILS NFR BLD AUTO: 0.7 %
BILIRUB SERPL-MCNC: 0.1 MG/DL
BILIRUB UR QL STRIP.AUTO: NEGATIVE
BUN SERPL-MCNC: 13.5 MG/DL (ref 8.9–20.6)
CALCIUM SERPL-MCNC: 9.4 MG/DL (ref 8.4–10.2)
CHLORIDE SERPL-SCNC: 106 MMOL/L (ref 98–107)
CHOLEST SERPL-MCNC: 187 MG/DL
CHOLEST/HDLC SERPL: 5 {RATIO} (ref 0–5)
CO2 SERPL-SCNC: 26 MMOL/L (ref 22–29)
COLOR UR: YELLOW
CREAT SERPL-MCNC: 0.74 MG/DL (ref 0.73–1.18)
EOSINOPHIL # BLD AUTO: 0.27 X10(3)/MCL (ref 0–0.9)
EOSINOPHIL NFR BLD AUTO: 3.3 %
ERYTHROCYTE [DISTWIDTH] IN BLOOD BY AUTOMATED COUNT: 13 % (ref 11.5–17)
EST. AVERAGE GLUCOSE BLD GHB EST-MCNC: 91.1 MG/DL
GFR SERPLBLD CREATININE-BSD FMLA CKD-EPI: >60 MLS/MIN/1.73/M2
GLOBULIN SER-MCNC: 3.4 GM/DL (ref 2.4–3.5)
GLUCOSE SERPL-MCNC: 97 MG/DL (ref 74–100)
GLUCOSE UR QL STRIP.AUTO: NORMAL
HBA1C MFR BLD: 4.8 %
HCT VFR BLD AUTO: 42 % (ref 42–52)
HDLC SERPL-MCNC: 38 MG/DL (ref 35–60)
HGB BLD-MCNC: 14.3 G/DL (ref 14–18)
HIV 1+2 AB+HIV1 P24 AG SERPL QL IA: NONREACTIVE
HYALINE CASTS #/AREA URNS LPF: ABNORMAL /LPF
IMM GRANULOCYTES # BLD AUTO: 0.04 X10(3)/MCL (ref 0–0.04)
IMM GRANULOCYTES NFR BLD AUTO: 0.5 %
KETONES UR QL STRIP.AUTO: NEGATIVE
LDLC SERPL CALC-MCNC: 122 MG/DL (ref 50–140)
LEUKOCYTE ESTERASE UR QL STRIP.AUTO: NEGATIVE
LYMPHOCYTES # BLD AUTO: 2.36 X10(3)/MCL (ref 0.6–4.6)
LYMPHOCYTES NFR BLD AUTO: 29.3 %
MCH RBC QN AUTO: 30 PG (ref 27–31)
MCHC RBC AUTO-ENTMCNC: 34 G/DL (ref 33–36)
MCV RBC AUTO: 88.1 FL (ref 80–94)
MONOCYTES # BLD AUTO: 0.73 X10(3)/MCL (ref 0.1–1.3)
MONOCYTES NFR BLD AUTO: 9.1 %
MUCOUS THREADS URNS QL MICRO: ABNORMAL /LPF
NEUTROPHILS # BLD AUTO: 4.6 X10(3)/MCL (ref 2.1–9.2)
NEUTROPHILS NFR BLD AUTO: 57.1 %
NITRITE UR QL STRIP.AUTO: NEGATIVE
NRBC BLD AUTO-RTO: 0 %
PH UR STRIP.AUTO: 6 [PH]
PLATELET # BLD AUTO: 269 X10(3)/MCL (ref 130–400)
PMV BLD AUTO: 11.6 FL (ref 7.4–10.4)
POTASSIUM SERPL-SCNC: 4.7 MMOL/L (ref 3.5–5.1)
PROT SERPL-MCNC: 7.5 GM/DL (ref 6.4–8.3)
PROT UR QL STRIP.AUTO: NEGATIVE
PSA SERPL-MCNC: 0.31 NG/ML
RBC # BLD AUTO: 4.77 X10(6)/MCL (ref 4.7–6.1)
RBC #/AREA URNS AUTO: ABNORMAL /HPF
RBC UR QL AUTO: NEGATIVE
SODIUM SERPL-SCNC: 141 MMOL/L (ref 136–145)
SP GR UR STRIP.AUTO: 1.02
SQUAMOUS #/AREA URNS LPF: ABNORMAL /HPF
T4 FREE SERPL-MCNC: 0.78 NG/DL (ref 0.7–1.48)
TRIGL SERPL-MCNC: 134 MG/DL (ref 34–140)
TSH SERPL-ACNC: 0.7 UIU/ML (ref 0.35–4.94)
UROBILINOGEN UR STRIP-ACNC: NORMAL
VLDLC SERPL CALC-MCNC: 27 MG/DL
WBC # SPEC AUTO: 8.06 X10(3)/MCL (ref 4.5–11.5)
WBC #/AREA URNS AUTO: ABNORMAL /HPF

## 2023-08-08 PROCEDURE — 84443 ASSAY THYROID STIM HORMONE: CPT | Performed by: STUDENT IN AN ORGANIZED HEALTH CARE EDUCATION/TRAINING PROGRAM

## 2023-08-08 PROCEDURE — 1159F MED LIST DOCD IN RCRD: CPT | Mod: CPTII,,, | Performed by: STUDENT IN AN ORGANIZED HEALTH CARE EDUCATION/TRAINING PROGRAM

## 2023-08-08 PROCEDURE — 99214 OFFICE O/P EST MOD 30 MIN: CPT | Mod: 25,S$PBB,, | Performed by: STUDENT IN AN ORGANIZED HEALTH CARE EDUCATION/TRAINING PROGRAM

## 2023-08-08 PROCEDURE — 3080F DIAST BP >= 90 MM HG: CPT | Mod: CPTII,,, | Performed by: STUDENT IN AN ORGANIZED HEALTH CARE EDUCATION/TRAINING PROGRAM

## 2023-08-08 PROCEDURE — 87389 HIV-1 AG W/HIV-1&-2 AB AG IA: CPT | Performed by: STUDENT IN AN ORGANIZED HEALTH CARE EDUCATION/TRAINING PROGRAM

## 2023-08-08 PROCEDURE — 3044F HG A1C LEVEL LT 7.0%: CPT | Mod: CPTII,,, | Performed by: STUDENT IN AN ORGANIZED HEALTH CARE EDUCATION/TRAINING PROGRAM

## 2023-08-08 PROCEDURE — 3077F SYST BP >= 140 MM HG: CPT | Mod: CPTII,,, | Performed by: STUDENT IN AN ORGANIZED HEALTH CARE EDUCATION/TRAINING PROGRAM

## 2023-08-08 PROCEDURE — 3077F PR MOST RECENT SYSTOLIC BLOOD PRESSURE >= 140 MM HG: ICD-10-PCS | Mod: CPTII,,, | Performed by: STUDENT IN AN ORGANIZED HEALTH CARE EDUCATION/TRAINING PROGRAM

## 2023-08-08 PROCEDURE — 99406 PR TOBACCO USE CESSATION INTERMEDIATE 3-10 MINUTES: ICD-10-PCS | Mod: S$PBB,,, | Performed by: STUDENT IN AN ORGANIZED HEALTH CARE EDUCATION/TRAINING PROGRAM

## 2023-08-08 PROCEDURE — 84439 ASSAY OF FREE THYROXINE: CPT | Performed by: STUDENT IN AN ORGANIZED HEALTH CARE EDUCATION/TRAINING PROGRAM

## 2023-08-08 PROCEDURE — 83036 HEMOGLOBIN GLYCOSYLATED A1C: CPT | Performed by: STUDENT IN AN ORGANIZED HEALTH CARE EDUCATION/TRAINING PROGRAM

## 2023-08-08 PROCEDURE — 99406 BEHAV CHNG SMOKING 3-10 MIN: CPT | Mod: S$PBB,,, | Performed by: STUDENT IN AN ORGANIZED HEALTH CARE EDUCATION/TRAINING PROGRAM

## 2023-08-08 PROCEDURE — 1159F PR MEDICATION LIST DOCUMENTED IN MEDICAL RECORD: ICD-10-PCS | Mod: CPTII,,, | Performed by: STUDENT IN AN ORGANIZED HEALTH CARE EDUCATION/TRAINING PROGRAM

## 2023-08-08 PROCEDURE — 84153 ASSAY OF PSA TOTAL: CPT | Performed by: STUDENT IN AN ORGANIZED HEALTH CARE EDUCATION/TRAINING PROGRAM

## 2023-08-08 PROCEDURE — 99214 OFFICE O/P EST MOD 30 MIN: CPT | Mod: PBBFAC,PN | Performed by: STUDENT IN AN ORGANIZED HEALTH CARE EDUCATION/TRAINING PROGRAM

## 2023-08-08 PROCEDURE — 74019 RADEX ABDOMEN 2 VIEWS: CPT | Mod: TC,PN

## 2023-08-08 PROCEDURE — 80061 LIPID PANEL: CPT | Performed by: STUDENT IN AN ORGANIZED HEALTH CARE EDUCATION/TRAINING PROGRAM

## 2023-08-08 PROCEDURE — 81001 URINALYSIS AUTO W/SCOPE: CPT | Performed by: STUDENT IN AN ORGANIZED HEALTH CARE EDUCATION/TRAINING PROGRAM

## 2023-08-08 PROCEDURE — 36415 COLL VENOUS BLD VENIPUNCTURE: CPT | Performed by: STUDENT IN AN ORGANIZED HEALTH CARE EDUCATION/TRAINING PROGRAM

## 2023-08-08 PROCEDURE — 3044F PR MOST RECENT HEMOGLOBIN A1C LEVEL <7.0%: ICD-10-PCS | Mod: CPTII,,, | Performed by: STUDENT IN AN ORGANIZED HEALTH CARE EDUCATION/TRAINING PROGRAM

## 2023-08-08 PROCEDURE — 99214 PR OFFICE/OUTPT VISIT, EST, LEVL IV, 30-39 MIN: ICD-10-PCS | Mod: 25,S$PBB,, | Performed by: STUDENT IN AN ORGANIZED HEALTH CARE EDUCATION/TRAINING PROGRAM

## 2023-08-08 PROCEDURE — 3080F PR MOST RECENT DIASTOLIC BLOOD PRESSURE >= 90 MM HG: ICD-10-PCS | Mod: CPTII,,, | Performed by: STUDENT IN AN ORGANIZED HEALTH CARE EDUCATION/TRAINING PROGRAM

## 2023-08-08 PROCEDURE — 85025 COMPLETE CBC W/AUTO DIFF WBC: CPT | Performed by: STUDENT IN AN ORGANIZED HEALTH CARE EDUCATION/TRAINING PROGRAM

## 2023-08-08 PROCEDURE — 87522 HEPATITIS C REVRS TRNSCRPJ: CPT | Performed by: STUDENT IN AN ORGANIZED HEALTH CARE EDUCATION/TRAINING PROGRAM

## 2023-08-08 PROCEDURE — 80053 COMPREHEN METABOLIC PANEL: CPT | Performed by: STUDENT IN AN ORGANIZED HEALTH CARE EDUCATION/TRAINING PROGRAM

## 2023-08-08 PROCEDURE — 3008F PR BODY MASS INDEX (BMI) DOCUMENTED: ICD-10-PCS | Mod: CPTII,,, | Performed by: STUDENT IN AN ORGANIZED HEALTH CARE EDUCATION/TRAINING PROGRAM

## 2023-08-08 PROCEDURE — 3008F BODY MASS INDEX DOCD: CPT | Mod: CPTII,,, | Performed by: STUDENT IN AN ORGANIZED HEALTH CARE EDUCATION/TRAINING PROGRAM

## 2023-08-08 RX ORDER — ONDANSETRON 8 MG/1
8 TABLET, ORALLY DISINTEGRATING ORAL EVERY 12 HOURS PRN
Qty: 15 TABLET | Refills: 0 | Status: SHIPPED | OUTPATIENT
Start: 2023-08-08 | End: 2023-12-07

## 2023-08-08 RX ORDER — AMOXICILLIN 500 MG/1
500 CAPSULE ORAL 3 TIMES DAILY
COMMUNITY
Start: 2023-07-29 | End: 2023-08-08

## 2023-08-08 RX ORDER — LACTULOSE 10 G/15ML
10 SOLUTION ORAL DAILY
Qty: 450 ML | Refills: 11 | Status: SHIPPED | OUTPATIENT
Start: 2023-08-08 | End: 2023-10-25

## 2023-08-08 RX ORDER — NIFEDIPINE 60 MG/1
60 TABLET, EXTENDED RELEASE ORAL DAILY
Qty: 30 TABLET | Refills: 11 | Status: SHIPPED | OUTPATIENT
Start: 2023-08-08 | End: 2024-08-07

## 2023-08-08 NOTE — ASSESSMENT & PLAN NOTE
Again spoke with patient regarding his hepatic steatosis and hep C positive antibody in the blood with high viral load  Discussed long term side effects and sequela of hep C that is untreated such as cirrhosis and or liver cancer  Also discussed with patient and girlfriend that hepatitis C is spread by bodily fluids and is contagious  Urged girlfriend to get tested as well

## 2023-08-08 NOTE — ASSESSMENT & PLAN NOTE
Discussed patient's heavy smoking 2-3 packs per day as well as his reluctance to use nicotine patches for fear of making his hypertension worse  Spent 5 minutes discussing ill effects of smoking such as carcinogens, coronary artery disease, raising blood pressure verses nicotine patches which may raise blood pressure but do not have other associated findings  Will place referral to smoking cessation for patient   Patient already has patches in his possession

## 2023-08-08 NOTE — ASSESSMENT & PLAN NOTE
Discussed cholesterol results with patient continue pravastatin 20 mg daily   Not adding Crestor or atorvastatin at this time secondary to patient likely undergoing hepatitis-C treatment

## 2023-08-08 NOTE — PROGRESS NOTES
Patient Name: Michael Pickard     : 1985    MRN: [id number]     Subjective:     Patient ID: Michael Pickard is a 38 y.o. male.    Chief Complaint:   Chief Complaint   Patient presents with    Follow-up     High BP; abdominal pain into groin area        HPI: HPI  38-year-old male presents to clinic for follow up of new issue of not feeling well. Patient reports that has been having     Constipation  Going to bathroom 1-2 times per week. Bowel movements are hard and often feels Nausea     Hypertension  Patient states he has been taking amlodipine 10 mg daily blood pressure 151/98  No CP but does complain of feeling dizzy at times     Neuropathy  Last visit patient was started on 800 mg gabapentin 3 times a day      Hepatitis-C  Chart records indicate that patient is hepatitis-C positive   Has not had treatment  Was afraid to go to Infectious Disease Clinic secondary to worrying about side effects of medication  US shows hepatic steatosis. Reviewed with patient.   Patient did  have RNA quant completed and viral load is over 1 million. Has not gone to ID clinic after re -referral.     Grief/Depression  Wellbutrin- Naloxone 8 mg 2 TID  Vistaril 25 mg daily  Oxcarbazepine 600 mg BID  Seroquel 200 mg   Sertraline 100 mg   Erectile Dysfunction  Reports that since Zoloft increased he has been having ED with problems achieving erections.     Smoking  Reports smoking 2-3packs of cigarettes per day and would like help quitting    History of polysubstance use  Reports history of polysubstance use but has been clean for 3 years.   Is prescribed suboxone     HLD  Pravastatin 20 mg     ROS:      ROS     12 point review of systems conducted, negative except as stated in the history of present illness. See HPI for details.    History:     Past Medical History:   Diagnosis Date    Anxiety     Depression     Hypertension         Past Surgical History:   Procedure Laterality Date    HERNIA REPAIR         History reviewed. No pertinent  "family history.     Social History     Tobacco Use    Smoking status: Every Day     Current packs/day: 1.00     Average packs/day: 1 pack/day for 20.0 years (20.0 ttl pk-yrs)     Types: Cigarettes     Passive exposure: Never    Smokeless tobacco: Never   Substance and Sexual Activity    Alcohol use: Not Currently    Drug use: Not Currently     Comment: rehab, not longer taking    Sexual activity: Yes     Partners: Female       Current Outpatient Medications   Medication Instructions    buprenorphine-naloxone 8-2 (SUBOXONE) 8-2 mg Subl 1 tablet, Sublingual, 3 times daily    clotrimazole (LOTRIMIN) 1 % cream Topical (Top), 2 times daily    gabapentin (NEURONTIN) 800 mg, Oral, 3 times daily    lactulose (CHRONULAC) 10 g, Oral, Daily    naproxen (NAPROSYN) 500 mg, Oral, 2 times daily    nicotine (NICODERM CQ) 14 mg/24 hr 1 patch, Transdermal, Daily    nicotine (NICODERM CQ) 21 mg/24 hr 1 patch, Transdermal, Daily    nicotine (NICODERM CQ) 7 mg/24 hr 1 patch, Transdermal, Daily    nicotine (polacrilex) (NICORETTE) 4 mg, Oral, As needed (PRN)    NIFEdipine (PROCARDIA-XL) 60 mg, Oral, Daily    ondansetron (ZOFRAN-ODT) 8 mg, Oral, Every 12 hours PRN    OXcarbazepine (TRILEPTAL) 600 mg, Oral, 2 times daily    pravastatin (PRAVACHOL) 20 mg, Oral, Daily    QUEtiapine (SEROQUEL) 200 mg, Oral, Nightly    sertraline (ZOLOFT) 100 mg, Oral, Daily        Review of patient's allergies indicates:  No Known Allergies    Objective:     Visit Vitals  BP (!) 151/98 (BP Location: Left arm, Patient Position: Sitting, BP Method: Medium (Automatic))   Pulse 75   Temp 97.8 °F (36.6 °C) (Oral)   Resp 20   Ht 6' 1" (1.854 m)   Wt 94.4 kg (208 lb 1.6 oz)   SpO2 99%   BMI 27.46 kg/m²       Physical Examination:     Physical Exam  Constitutional:       General: He is not in acute distress.  Eyes:      General: No scleral icterus.     Extraocular Movements: Extraocular movements intact.      Conjunctiva/sclera: Conjunctivae normal.      Pupils: " Pupils are equal, round, and reactive to light.   Cardiovascular:      Rate and Rhythm: Normal rate and regular rhythm.      Heart sounds: No murmur heard.  Pulmonary:      Effort: Pulmonary effort is normal. No respiratory distress.      Breath sounds: No stridor. No wheezing or rhonchi.   Abdominal:      General: There is no distension.      Palpations: Abdomen is soft. There is no mass.      Tenderness: There is no abdominal tenderness. There is no guarding or rebound.      Hernia: No hernia is present.      Comments: Decreased bowel sounds   Musculoskeletal:         General: No swelling. Normal range of motion.   Skin:     General: Skin is warm and dry.      Coloration: Skin is not jaundiced.      Findings: No rash.   Neurological:      Mental Status: He is alert.      Gait: Gait normal.   Psychiatric:         Thought Content: Thought content normal.         Lab Results:     Chemistry:  Lab Results   Component Value Date     08/08/2023    K 4.7 08/08/2023    CHLORIDE 106 08/08/2023    BUN 13.5 08/08/2023    CREATININE 0.74 08/08/2023    EGFRNORACEVR >60 08/08/2023    GLUCOSE 97 08/08/2023    CALCIUM 9.4 08/08/2023    ALKPHOS 71 08/08/2023    LABPROT 7.5 08/08/2023    ALBUMIN 4.1 08/08/2023    BILIDIR 0.1 04/05/2019    IBILI 0.4 04/05/2019    AST 26 08/08/2023    ALT 36 08/08/2023    TSH 0.705 08/08/2023    ORCFDL1QWHE 0.78 08/08/2023    PSA 0.31 08/08/2023        Lab Results   Component Value Date    HGBA1C 4.8 08/08/2023        Hematology:  Lab Results   Component Value Date    WBC 8.06 08/08/2023    HGB 14.3 08/08/2023    HCT 42.0 08/08/2023     08/08/2023       Lipid Panel:  Lab Results   Component Value Date    CHOL 187 08/08/2023    HDL 38 08/08/2023    .00 08/08/2023    TRIG 134 08/08/2023    TOTALCHOLEST 5 08/08/2023        Urine:  Lab Results   Component Value Date    COLORUA Yellow 08/08/2023    APPEARANCEUA Clear 08/08/2023    SGUA 1.025 08/08/2023    PHUA 6.0 08/08/2023     PROTEINUA Negative 08/08/2023    GLUCOSEUA Normal 08/08/2023    KETONESUA Negative 08/08/2023    BLOODUA Negative 08/08/2023    NITRITESUA Negative 08/08/2023    LEUKOCYTESUR Negative 08/08/2023    RBCUA 0-5 08/08/2023    WBCUA 0-5 08/08/2023    BACTERIA None Seen 08/08/2023    SQEPUA None Seen 08/08/2023    HYALINECASTS None Seen 08/08/2023        Assessment:          ICD-10-CM ICD-9-CM   1. Wellness examination  Z00.00 V70.0   2. Prostate cancer  C61 185   3. Hepatitis C antibody positive in blood  R76.8 795.79   4. Constipation, unspecified constipation type  K59.00 564.00   5. Uncontrolled hypertension  I10 401.9   6. Nausea  R11.0 787.02   7. Heavy smoker (more than 20 cigarettes per day)  F17.210 305.1   8. Neuropathy  G62.9 355.9   9. Other hyperlipidemia  E78.49 272.4        Plan:     1. Wellness examination  -     CBC Auto Differential; Future; Expected date: 08/08/2023  -     Comprehensive Metabolic Panel; Future; Expected date: 08/08/2023  -     Lipid Panel; Future; Expected date: 08/08/2023  -     TSH; Future; Expected date: 08/08/2023  -     Hemoglobin A1C; Future; Expected date: 08/08/2023  -     Urinalysis; Future; Expected date: 08/08/2023  -     T4, Free; Future; Expected date: 08/08/2023  -     HIV 1/2 Ag/Ab (4th Gen); Future; Expected date: 08/08/2023  -     Hepatitis C Virus Quantitative; Future; Expected date: 08/08/2023    2. Prostate cancer  -     PSA, Screening; Future; Expected date: 08/08/2023  -     Hepatitis C Virus Quantitative; Future; Expected date: 08/08/2023    3. Hepatitis C antibody positive in blood  Overview:  RNA quant today patient has been referred to ID     Assessment & Plan:  Again spoke with patient regarding his hepatic steatosis and hep C positive antibody in the blood with high viral load  Discussed long term side effects and sequela of hep C that is untreated such as cirrhosis and or liver cancer  Also discussed with patient and girlfriend that hepatitis C is spread by  bodily fluids and is contagious  Urged girlfriend to get tested as well    Orders:  -     Hepatitis C Viral(HCV) RNA, Quant Real-Time PCR w/Reflexs; Future; Expected date: 08/08/2023  -     Hepatitis C Virus Quantitative; Future; Expected date: 08/08/2023  -     Ambulatory referral/consult to Infectious Disease; Future; Expected date: 08/15/2023    4. Constipation, unspecified constipation type  -     X-Ray Abdomen Flat And Erect; Future; Expected date: 08/08/2023  -     lactulose (CHRONULAC) 20 gram/30 mL Soln; Take 15 mLs (10 g total) by mouth once daily.  Dispense: 450 mL; Refill: 11  -     Hepatitis C Virus Quantitative; Future; Expected date: 08/08/2023    5. Uncontrolled hypertension  Assessment & Plan:  Amlodipine 10 did not control blood pressure starting nifedipine 60    Orders:  -     NIFEdipine (PROCARDIA-XL) 60 MG (OSM) 24 hr tablet; Take 1 tablet (60 mg total) by mouth once daily.  Dispense: 30 tablet; Refill: 11  -     Hepatitis C Virus Quantitative; Future; Expected date: 08/08/2023    6. Nausea  -     ondansetron (ZOFRAN-ODT) 8 MG TbDL; Take 1 tablet (8 mg total) by mouth every 12 (twelve) hours as needed (nausea).  Dispense: 15 tablet; Refill: 0  -     Hepatitis C Virus Quantitative; Future; Expected date: 08/08/2023    7. Heavy smoker (more than 20 cigarettes per day)  Assessment & Plan:  Discussed patient's heavy smoking 2-3 packs per day as well as his reluctance to use nicotine patches for fear of making his hypertension worse  Spent 5 minutes discussing ill effects of smoking such as carcinogens, coronary artery disease, raising blood pressure verses nicotine patches which may raise blood pressure but do not have other associated findings  Will place referral to smoking cessation for patient   Patient already has patches in his possession      8. Neuropathy  Assessment & Plan:  Gabapentin 800 3 times per day      9. Other hyperlipidemia  Assessment & Plan:  Discussed cholesterol results with  patient continue pravastatin 20 mg daily   Not adding Crestor or atorvastatin at this time secondary to patient likely undergoing hepatitis-C treatment             Follow up in about 2 weeks (around 8/22/2023) for Hypertension, BP check, lab results.    Future Appointments   Date Time Provider Department Center   8/17/2023 10:30 AM Jacque Anderson MD [facility]        Jacque Anderson MD

## 2023-08-09 LAB — HCV RNA SERPL NAA+PROBE-ACNC: ABNORMAL IU/ML

## 2023-08-11 ENCOUNTER — HOSPITAL ENCOUNTER (EMERGENCY)
Facility: HOSPITAL | Age: 38
Discharge: HOME | End: 2023-08-11
Attending: STUDENT IN AN ORGANIZED HEALTH CARE EDUCATION/TRAINING PROGRAM
Payer: MEDICAID

## 2023-08-11 VITALS
WEIGHT: 208 LBS | BODY MASS INDEX: 27.44 KG/M2 | HEART RATE: 76 BPM | SYSTOLIC BLOOD PRESSURE: 112 MMHG | RESPIRATION RATE: 18 BRPM | OXYGEN SATURATION: 99 % | DIASTOLIC BLOOD PRESSURE: 84 MMHG | TEMPERATURE: 99 F

## 2023-08-11 DIAGNOSIS — N50.812 PAIN IN LEFT TESTICLE: ICD-10-CM

## 2023-08-11 DIAGNOSIS — R10.32 LEFT LOWER QUADRANT ABDOMINAL PAIN: Primary | ICD-10-CM

## 2023-08-11 DIAGNOSIS — N50.819 TESTICULAR PAIN: ICD-10-CM

## 2023-08-11 LAB
ALBUMIN SERPL-MCNC: 4.2 G/DL (ref 3.5–5)
ALBUMIN/GLOB SERPL: 1.4 RATIO (ref 1.1–2)
ALP SERPL-CCNC: 74 UNIT/L (ref 40–150)
ALT SERPL-CCNC: 41 UNIT/L (ref 0–55)
APPEARANCE UR: CLEAR
AST SERPL-CCNC: 33 UNIT/L (ref 5–34)
BACTERIA #/AREA URNS AUTO: NORMAL /HPF
BASOPHILS # BLD AUTO: 0.06 X10(3)/MCL
BASOPHILS NFR BLD AUTO: 0.5 %
BILIRUB SERPL-MCNC: 0.2 MG/DL
BILIRUB UR QL STRIP.AUTO: NEGATIVE
BUN SERPL-MCNC: 13.3 MG/DL (ref 8.9–20.6)
CALCIUM SERPL-MCNC: 9.7 MG/DL (ref 8.4–10.2)
CHLORIDE SERPL-SCNC: 106 MMOL/L (ref 98–107)
CO2 SERPL-SCNC: 26 MMOL/L (ref 22–29)
COLOR UR: YELLOW
CREAT SERPL-MCNC: 0.8 MG/DL (ref 0.73–1.18)
EOSINOPHIL # BLD AUTO: 0.32 X10(3)/MCL (ref 0–0.9)
EOSINOPHIL NFR BLD AUTO: 2.8 %
ERYTHROCYTE [DISTWIDTH] IN BLOOD BY AUTOMATED COUNT: 13.2 % (ref 11.5–17)
GFR SERPLBLD CREATININE-BSD FMLA CKD-EPI: >60 MLS/MIN/1.73/M2
GLOBULIN SER-MCNC: 3 GM/DL (ref 2.4–3.5)
GLUCOSE SERPL-MCNC: 99 MG/DL (ref 74–100)
GLUCOSE UR QL STRIP.AUTO: NEGATIVE
HCT VFR BLD AUTO: 39.2 % (ref 42–52)
HGB BLD-MCNC: 13.8 G/DL (ref 14–18)
IMM GRANULOCYTES # BLD AUTO: 0.07 X10(3)/MCL (ref 0–0.04)
IMM GRANULOCYTES NFR BLD AUTO: 0.6 %
KETONES UR QL STRIP.AUTO: NEGATIVE
LEUKOCYTE ESTERASE UR QL STRIP.AUTO: NEGATIVE
LIPASE SERPL-CCNC: 13 U/L
LYMPHOCYTES # BLD AUTO: 2.45 X10(3)/MCL (ref 0.6–4.6)
LYMPHOCYTES NFR BLD AUTO: 21.4 %
MCH RBC QN AUTO: 29.9 PG (ref 27–31)
MCHC RBC AUTO-ENTMCNC: 35.2 G/DL (ref 33–36)
MCV RBC AUTO: 85 FL (ref 80–94)
MONOCYTES # BLD AUTO: 1.07 X10(3)/MCL (ref 0.1–1.3)
MONOCYTES NFR BLD AUTO: 9.4 %
NEUTROPHILS # BLD AUTO: 7.47 X10(3)/MCL (ref 2.1–9.2)
NEUTROPHILS NFR BLD AUTO: 65.3 %
NITRITE UR QL STRIP.AUTO: NEGATIVE
NRBC BLD AUTO-RTO: 0 %
PH UR STRIP.AUTO: 7.5 [PH]
PLATELET # BLD AUTO: 251 X10(3)/MCL (ref 130–400)
PMV BLD AUTO: 11.2 FL (ref 7.4–10.4)
POTASSIUM SERPL-SCNC: 4.6 MMOL/L (ref 3.5–5.1)
PROT SERPL-MCNC: 7.2 GM/DL (ref 6.4–8.3)
PROT UR QL STRIP.AUTO: NEGATIVE
RBC # BLD AUTO: 4.61 X10(6)/MCL (ref 4.7–6.1)
RBC #/AREA URNS AUTO: <5 /HPF
RBC UR QL AUTO: NEGATIVE
SODIUM SERPL-SCNC: 139 MMOL/L (ref 136–145)
SP GR UR STRIP.AUTO: 1.02 (ref 1–1.03)
SQUAMOUS #/AREA URNS AUTO: <5 /HPF
UROBILINOGEN UR STRIP-ACNC: 1
WBC # SPEC AUTO: 11.44 X10(3)/MCL (ref 4.5–11.5)
WBC #/AREA URNS AUTO: <5 /HPF

## 2023-08-11 PROCEDURE — 25500020 PHARM REV CODE 255: Performed by: STUDENT IN AN ORGANIZED HEALTH CARE EDUCATION/TRAINING PROGRAM

## 2023-08-11 PROCEDURE — 83690 ASSAY OF LIPASE: CPT | Performed by: STUDENT IN AN ORGANIZED HEALTH CARE EDUCATION/TRAINING PROGRAM

## 2023-08-11 PROCEDURE — 96375 TX/PRO/DX INJ NEW DRUG ADDON: CPT

## 2023-08-11 PROCEDURE — 96374 THER/PROPH/DIAG INJ IV PUSH: CPT | Mod: 59

## 2023-08-11 PROCEDURE — 99285 EMERGENCY DEPT VISIT HI MDM: CPT | Mod: 25

## 2023-08-11 PROCEDURE — 81001 URINALYSIS AUTO W/SCOPE: CPT | Performed by: NURSE PRACTITIONER

## 2023-08-11 PROCEDURE — 63600175 PHARM REV CODE 636 W HCPCS: Performed by: NURSE PRACTITIONER

## 2023-08-11 PROCEDURE — 85025 COMPLETE CBC W/AUTO DIFF WBC: CPT | Performed by: NURSE PRACTITIONER

## 2023-08-11 PROCEDURE — 80053 COMPREHEN METABOLIC PANEL: CPT | Performed by: NURSE PRACTITIONER

## 2023-08-11 RX ORDER — CYCLOBENZAPRINE HCL 10 MG
10 TABLET ORAL 3 TIMES DAILY PRN
Qty: 30 TABLET | Refills: 0 | Status: SHIPPED | OUTPATIENT
Start: 2023-08-11 | End: 2023-08-21

## 2023-08-11 RX ORDER — KETOROLAC TROMETHAMINE 30 MG/ML
30 INJECTION, SOLUTION INTRAMUSCULAR; INTRAVENOUS
Status: COMPLETED | OUTPATIENT
Start: 2023-08-11 | End: 2023-08-11

## 2023-08-11 RX ORDER — ONDANSETRON 2 MG/ML
4 INJECTION INTRAMUSCULAR; INTRAVENOUS
Status: COMPLETED | OUTPATIENT
Start: 2023-08-11 | End: 2023-08-11

## 2023-08-11 RX ORDER — KETOROLAC TROMETHAMINE 10 MG/1
10 TABLET, FILM COATED ORAL
Qty: 15 TABLET | Refills: 0 | Status: SHIPPED | OUTPATIENT
Start: 2023-08-11 | End: 2023-08-16

## 2023-08-11 RX ORDER — ONDANSETRON 4 MG/1
4 TABLET, ORALLY DISINTEGRATING ORAL EVERY 8 HOURS PRN
Qty: 10 TABLET | Refills: 0 | Status: SHIPPED | OUTPATIENT
Start: 2023-08-11 | End: 2023-08-16

## 2023-08-11 RX ADMIN — IOPAMIDOL 100 ML: 755 INJECTION, SOLUTION INTRAVENOUS at 12:08

## 2023-08-11 RX ADMIN — ONDANSETRON 4 MG: 2 INJECTION INTRAMUSCULAR; INTRAVENOUS at 10:08

## 2023-08-11 RX ADMIN — KETOROLAC TROMETHAMINE 30 MG: 30 INJECTION, SOLUTION INTRAMUSCULAR; INTRAVENOUS at 10:08

## 2023-08-11 NOTE — FIRST PROVIDER EVALUATION
Medical screening examination initiated.  I have conducted a focused provider triage encounter, findings are as follows:    Brief history of present illness:  Patient states left lower abdominal pain and left testicular pain x several days. States constipation.     There were no vitals filed for this visit.    Pertinent physical exam:  Awake, alert, ambulatory      Brief workup plan:  Labs, [place]    Preliminary workup initiated; this workup will be continued and followed by the physician or advanced practice provider that is assigned to the patient when roomed.

## 2023-08-11 NOTE — ED PROVIDER NOTES
Encounter Date: 8/11/2023    SCRIBE #1 NOTE: I, Jonathan Dyer, am scribing for, and in the presence of,  Anthony Johnson MD. I have scribed the following portions of the note - Other sections scribed: HPI, ROS, PE.       History     Chief Complaint   Patient presents with    Testicle Pain    Abdominal Pain     Lower abd pain that travels to testicles (since last week) Pt reports slight swelling to testicles. Hx of hernia. Last bm this am. +nausea/vomiting. + dizziness.  Denies cp/sob     39 y/o male with a history of HTN, anxiety, and depression presents to the ED with an intermittent sharp and burning LLQ abdominal pain that radiates to his testicles and has worsened since onset at the end of last week. Pt describes the testicular pain as pressure and says it feels like they are being pulled whenever he walks. Denies hematuria. Reports a surgical history of right lower hernia repair.    The history is provided by the patient. No  was used.   Abdominal Pain  Illness onset: end of last week. The problem has been gradually worsening. The abdominal pain is located in the LLQ. The abdominal pain radiates to the scrotum. The other symptoms of the illness do not include fever, shortness of breath or dysuria.   Symptoms associated with the illness do not include hematuria.     Review of patient's allergies indicates:  No Known Allergies  Past Medical History:   Diagnosis Date    Anxiety     Depression     Hypertension      Past Surgical History:   Procedure Laterality Date    HERNIA REPAIR       No family history on file.  Social History     Tobacco Use    Smoking status: Every Day     Current packs/day: 1.00     Average packs/day: 1 pack/day for 20.0 years (20.0 ttl pk-yrs)     Types: Cigarettes     Passive exposure: Never    Smokeless tobacco: Never   Substance Use Topics    Alcohol use: Not Currently    Drug use: Not Currently     Comment: rehab, not longer taking     Review of Systems    Constitutional:  Negative for fever.   HENT:  Negative for sore throat.    Eyes:  Negative for visual disturbance.   Respiratory:  Negative for shortness of breath.    Cardiovascular:  Negative for chest pain.   Gastrointestinal:  Positive for abdominal pain.   Genitourinary:  Positive for testicular pain. Negative for dysuria and hematuria.   Musculoskeletal:  Negative for joint swelling.   Skin:  Negative for rash.   Neurological:  Negative for weakness.   Psychiatric/Behavioral:  Negative for confusion.    All other systems reviewed and are negative.      Physical Exam     Initial Vitals [08/11/23 0956]   BP Pulse Resp Temp SpO2   136/86 81 20 98.5 °F (36.9 °C) 97 %      MAP       --         Physical Exam    Nursing note and vitals reviewed.  Constitutional: He appears well-developed and well-nourished. He is not diaphoretic. No distress.   HENT:   Head: Normocephalic and atraumatic.   Eyes: Conjunctivae and EOM are normal. Pupils are equal, round, and reactive to light.   Neck:   Normal range of motion.  Cardiovascular:  Normal rate, regular rhythm, normal heart sounds and intact distal pulses.           No murmur heard.  Pulmonary/Chest: Breath sounds normal. No respiratory distress. He has no wheezes. He has no rales.   Abdominal: Abdomen is soft. He exhibits no distension. There is abdominal tenderness in the left lower quadrant. Hernia confirmed negative in the right inguinal area and confirmed negative in the left inguinal area.   Genitourinary:    Testes and penis normal.   Right testis shows no mass, no swelling and no tenderness. Left testis shows no mass, no swelling and no tenderness.    Genitourinary Comments: Chaperone present.      Musculoskeletal:         General: No tenderness or edema. Normal range of motion.      Cervical back: Normal range of motion.     Neurological: He is alert and oriented to person, place, and time. No cranial nerve deficit.   Skin: Skin is warm and dry. Capillary refill  takes less than 2 seconds. No rash noted. No erythema.   Psychiatric: He has a normal mood and affect.         ED Course   Procedures  Labs Reviewed   CBC WITH DIFFERENTIAL - Abnormal; Notable for the following components:       Result Value    RBC 4.61 (*)     Hgb 13.8 (*)     Hct 39.2 (*)     MPV 11.2 (*)     IG# 0.07 (*)     All other components within normal limits   URINALYSIS, REFLEX TO URINE CULTURE - Normal   LIPASE - Normal   URINALYSIS, MICROSCOPIC - Normal   COMPREHENSIVE METABOLIC PANEL   CBC W/ AUTO DIFFERENTIAL    Narrative:     The following orders were created for panel order CBC Auto Differential.  Procedure                               Abnormality         Status                     ---------                               -----------         ------                     CBC with Differential[[id number]]        Abnormal            Final result                 Please view results for these tests on the individual orders.          Imaging Results              CT Abdomen Pelvis With Contrast (Final result)  Result time 08/11/23 13:10:09      Final result by Sonia Keith MD (08/11/23 13:10:09)                   Impression:      Bibasilar atelectasis otherwise unremarkable      Electronically signed by: Sonia Keith  Date:    08/11/2023  Time:    13:10               Narrative:    EXAMINATION:  CT ABDOMEN PELVIS WITH CONTRAST    CLINICAL HISTORY:  LLQ abdominal pain;    TECHNIQUE:  Low dose axial images, sagittal and coronal reformations were obtained from the lung bases to the pubic symphysis following the IV administration of contrast. Automatic exposure control (AEC) is utilized to reduce patient radiation exposure.    COMPARISON:  11/15/2018    FINDINGS:  There is bibasilar atelectasis..    The liver appears normal.  No liver mass or lesion is seen.  Portal and hepatic veins appear normal.    The gallbladder appears normal.  No obvious gallstones are seen.  No biliary dilatation is seen.   No pericholecystic fluid is seen.    The pancreas appears normal.  No pancreatic mass or lesion is seen.    The spleen shows no acute abnormality.    The adrenal glands appear normal.  No adrenal nodule is seen.    The kidneys appear normal.  No hydronephrosis is seen.  No hydroureter is seen.  No nephrolithiasis is seen.  No obvious ureteral stones are seen.    Urinary bladder appears grossly unremarkable.    No colitis is seen.  No diverticulitis is seen.  No obvious colonic mass or lesion is seen.  The appendix appears normal.    No free air is seen.  No free fluid is seen.                                       US Scrotum And Testicles (Final result)  Result time 08/11/23 11:28:05      Final result by Kevin Dowling MD (08/11/23 11:28:05)                   Impression:      Negative for testicular torsion and solid mass.      Electronically signed by: Kevin Dowling  Date:    08/11/2023  Time:    11:28               Narrative:    EXAMINATION:  US SCROTUM AND TESTICLES    CLINICAL HISTORY:  Testicular pain, unspecified    COMPARISON:  5 March 2020    FINDINGS:  Real-time exam with grayscale, color, and spectral imaging of the scrotum was performed.    The right testicle measures 4.4 x 3.6 x 2.4 cm and the left measures 3.8 x 3.1 x 2.4 cm. Color-flow and arterial waveforms are present within both testicles. There are no findings of torsion. The testicles are homogeneous in echotexture without intratesticular mass.    There is a small right hydrocele.  Mild bilateral varicoceles.                                       Medications   ondansetron injection 4 mg (4 mg Intravenous Given 8/11/23 1020)   ketorolac injection 30 mg (30 mg Intravenous Given 8/11/23 1023)   iopamidoL (ISOVUE-370) injection 100 mL (100 mLs Intravenous Given 8/11/23 1238)     Medical Decision Making:   History:   I obtained history from: someone other than patient.       <> Summary of History: Collateral from family member at bedside.  Old  Medical Records: I decided to obtain old medical records.  Old Records Summarized: records from previous admission(s), records from clinic visits and records from another hospital.       <> Summary of Records: Review previous records, history of hepatitis-C and previous hernia repair  Initial Assessment:   Abdominal pain, scrotal pain  Differential Diagnosis:   Judging by the patient's chief complaint and pertinent history, the patient has the following possible differential diagnoses, including but not limited to the following.  Some of these are deemed to be lower likelihood and some more likely based on my physical exam and history combined with possible lab work and/or imaging studies.   Please see the pertinent studies, and refer to the HPI.  Some of these diagnoses will take further evaluation to fully rule out, perhaps as an outpatient and the patient was encouraged to follow up when discharged for more comprehensive evaluation.    Epididymitis, testicular torsion, folliculitis, cellulitis, orchitis, abscess, neoplasm, urolithiasis, hernia, traumatic injury  appendicitis, biliary disease, diverticulitis,  intraabdominal abscess, retroperitoneal abscess, gastritis, gastroenteritis, hepatitis, hernia, pancreatitis, inflammatory bowel disease, PUD, SBP, nephrolithiasis, constipation, GERD, IBS    Clinical Tests:   Lab Tests: Ordered and Reviewed  Radiological Study: Ordered and Reviewed  ED Management:  Patient is a 38-year-old male presents to the emergency department for left lower quadrant abdominal pain, scrotal pain.  See HPI.  Physical exam without any evidence of abnormality on testicular examination.  Denies any discharge or burning with urination.  Urinalysis without any acute abnormality.  No hematuria to suggest kidney stone.  Ultrasound without evidence of torsion.  No masses or other abnormality visualized.  No signs of infection.  Vital signs stable.  Afebrile.  CT imaging obtained given left lower  quadrant tenderness.  No acute surgical abnormality or infectious abnormality on CT imaging visualized.  Patient's pain and nausea control.  Tolerating PO.  All results discussed with the patient.  Discussed need for follow-up with the primary care physician.  Discussed return precautions.  Answered all questions at this time.  Hemodynamically stable for continued outpatient management strict return precautions.  Patient family verbalized understanding agreed to plan as discussed.          Scribe Attestation:   Scribe #1: I performed the above scribed service and the documentation accurately describes the services I performed. I attest to the accuracy of the note.    Attending Attestation:           Physician Attestation for Scribe:  Physician Attestation Statement for Scribe #1: I, Anthony Johnson MD, reviewed documentation, as scribed by Jonathan Dyer in my presence, and it is both accurate and complete.         Medical Decision Making  Problems Addressed:  Left lower quadrant abdominal pain: acute illness or injury that poses a threat to life or bodily functions  Pain in left testicle: acute illness or injury that poses a threat to life or bodily functions    Amount and/or Complexity of Data Reviewed  Labs: ordered.  Radiology: ordered.    Risk  Prescription drug management.  Parenteral controlled substances.                        Clinical Impression:   Final diagnoses:  [R10.32] Left lower quadrant abdominal pain (Primary)  [N50.812] Pain in left testicle        ED Disposition Condition    Discharge Stable          ED Prescriptions       Medication Sig Dispense Start Date End Date Auth. Provider    cyclobenzaprine (FLEXERIL) 10 MG tablet Take 1 tablet (10 mg total) by mouth 3 (three) times daily as needed for Muscle spasms. 30 tablet 8/11/2023 8/21/2023 Anthony Johnson MD    ketorolac (TORADOL) 10 mg tablet Take 1 tablet (10 mg total) by mouth every 6 to 8 hours as needed for Pain. 15 tablet 8/11/2023 8/16/2023  Anthony Johnson MD    ondansetron (ZOFRAN-ODT) 4 MG TbDL Take 1 tablet (4 mg total) by mouth every 8 (eight) hours as needed. 10 tablet 8/11/2023 8/16/2023 Anthony Johnson MD          Follow-up Information       Follow up With Specialties Details Why Contact Info    Jacque Anderson MD Family Medicine   [address] [place] [id number] [phone]      Your primary care physician.                 Anthony Johnson MD  08/13/23 9884

## 2023-08-11 NOTE — DISCHARGE INSTRUCTIONS
Follow-up with the primary care physician.      Return to the emergency department if any new or worsening pain, swelling, fever, nausea, vomiting, blood in vomit, blood in stool, chest pain, shortness of breath, or any other symptoms.

## 2023-08-11 NOTE — Clinical Note
"Michael "Michaelnisha Pickard was seen and treated in our emergency department on 8/11/2023.  He may return to work on 08/14/2023.       If you have any questions or concerns, please don't hesitate to call.      Anthony Johnson MD"

## 2023-08-29 ENCOUNTER — LAB VISIT (OUTPATIENT)
Dept: LAB | Facility: HOSPITAL | Age: 38
End: 2023-08-29
Attending: STUDENT IN AN ORGANIZED HEALTH CARE EDUCATION/TRAINING PROGRAM
Payer: MEDICAID

## 2023-08-29 DIAGNOSIS — R76.8 HEPATITIS C ANTIBODY POSITIVE IN BLOOD: ICD-10-CM

## 2023-08-29 PROCEDURE — 36415 COLL VENOUS BLD VENIPUNCTURE: CPT

## 2023-08-29 PROCEDURE — 87522 HEPATITIS C REVRS TRNSCRPJ: CPT

## 2023-08-30 LAB — HCV RNA SERPL NAA+PROBE-ACNC: ABNORMAL IU/ML

## 2023-10-16 ENCOUNTER — TELEPHONE (OUTPATIENT)
Dept: INFECTIOUS DISEASES | Facility: CLINIC | Age: 38
End: 2023-10-16
Payer: MEDICAID

## 2023-10-16 DIAGNOSIS — R76.8 HEPATITIS C ANTIBODY POSITIVE IN BLOOD: Primary | ICD-10-CM

## 2023-10-16 NOTE — TELEPHONE ENCOUNTER
----- Message from Mayela Mena MA sent at 10/13/2023  3:35 PM CDT -----  Regarding: New Hep C  Perla Pt- Hoda     Patient above have a New Hep C appt on Wednesday Oct 25, 2025 at 10:30am. Patient may need new Hep C labs. Please Advise

## 2023-10-25 ENCOUNTER — LAB VISIT (OUTPATIENT)
Dept: LAB | Facility: HOSPITAL | Age: 38
End: 2023-10-25
Attending: NURSE PRACTITIONER
Payer: MEDICAID

## 2023-10-25 ENCOUNTER — OFFICE VISIT (OUTPATIENT)
Dept: INFECTIOUS DISEASES | Facility: CLINIC | Age: 38
End: 2023-10-25
Payer: MEDICAID

## 2023-10-25 VITALS
HEART RATE: 84 BPM | DIASTOLIC BLOOD PRESSURE: 65 MMHG | HEIGHT: 73 IN | TEMPERATURE: 98 F | SYSTOLIC BLOOD PRESSURE: 91 MMHG | RESPIRATION RATE: 18 BRPM | BODY MASS INDEX: 27.04 KG/M2 | WEIGHT: 204 LBS

## 2023-10-25 DIAGNOSIS — F17.200 NICOTINE DEPENDENCE, UNCOMPLICATED, UNSPECIFIED NICOTINE PRODUCT TYPE: ICD-10-CM

## 2023-10-25 DIAGNOSIS — B18.2 CHRONIC HEPATITIS C WITHOUT HEPATIC COMA: Primary | ICD-10-CM

## 2023-10-25 DIAGNOSIS — Z23 IMMUNIZATION DUE: ICD-10-CM

## 2023-10-25 DIAGNOSIS — R76.8 HEPATITIS C ANTIBODY POSITIVE IN BLOOD: ICD-10-CM

## 2023-10-25 LAB
FERRITIN SERPL-MCNC: 75.37 NG/ML (ref 21.81–274.66)
INR PPP: 1
PROTHROMBIN TIME: 13.5 SECONDS (ref 12.5–14.5)
T PALLIDUM AB SER QL: NONREACTIVE

## 2023-10-25 PROCEDURE — 86039 ANTINUCLEAR ANTIBODIES (ANA): CPT

## 2023-10-25 PROCEDURE — 99406 BEHAV CHNG SMOKING 3-10 MIN: CPT | Mod: S$PBB,,, | Performed by: NURSE PRACTITIONER

## 2023-10-25 PROCEDURE — 86709 HEPATITIS A IGM ANTIBODY: CPT

## 2023-10-25 PROCEDURE — 3008F BODY MASS INDEX DOCD: CPT | Mod: CPTII,,, | Performed by: NURSE PRACTITIONER

## 2023-10-25 PROCEDURE — 1159F MED LIST DOCD IN RCRD: CPT | Mod: CPTII,,, | Performed by: NURSE PRACTITIONER

## 2023-10-25 PROCEDURE — 3078F DIAST BP <80 MM HG: CPT | Mod: CPTII,,, | Performed by: NURSE PRACTITIONER

## 2023-10-25 PROCEDURE — 90471 IMMUNIZATION ADMIN: CPT | Mod: PBBFAC

## 2023-10-25 PROCEDURE — 3074F PR MOST RECENT SYSTOLIC BLOOD PRESSURE < 130 MM HG: ICD-10-PCS | Mod: CPTII,,, | Performed by: NURSE PRACTITIONER

## 2023-10-25 PROCEDURE — 99213 OFFICE O/P EST LOW 20 MIN: CPT | Mod: 25,S$PBB,, | Performed by: NURSE PRACTITIONER

## 2023-10-25 PROCEDURE — 3008F PR BODY MASS INDEX (BMI) DOCUMENTED: ICD-10-PCS | Mod: CPTII,,, | Performed by: NURSE PRACTITIONER

## 2023-10-25 PROCEDURE — 86780 TREPONEMA PALLIDUM: CPT

## 2023-10-25 PROCEDURE — 99406 PR TOBACCO USE CESSATION INTERMEDIATE 3-10 MINUTES: ICD-10-PCS | Mod: S$PBB,,, | Performed by: NURSE PRACTITIONER

## 2023-10-25 PROCEDURE — 3074F SYST BP LT 130 MM HG: CPT | Mod: CPTII,,, | Performed by: NURSE PRACTITIONER

## 2023-10-25 PROCEDURE — 1160F RVW MEDS BY RX/DR IN RCRD: CPT | Mod: CPTII,,, | Performed by: NURSE PRACTITIONER

## 2023-10-25 PROCEDURE — 86704 HEP B CORE ANTIBODY TOTAL: CPT

## 2023-10-25 PROCEDURE — 1159F PR MEDICATION LIST DOCUMENTED IN MEDICAL RECORD: ICD-10-PCS | Mod: CPTII,,, | Performed by: NURSE PRACTITIONER

## 2023-10-25 PROCEDURE — 81596 NFCT DS CHRNC HCV 6 ASSAYS: CPT

## 2023-10-25 PROCEDURE — 99215 OFFICE O/P EST HI 40 MIN: CPT | Mod: PBBFAC | Performed by: NURSE PRACTITIONER

## 2023-10-25 PROCEDURE — 90686 IIV4 VACC NO PRSV 0.5 ML IM: CPT | Mod: PBBFAC

## 2023-10-25 PROCEDURE — 87902 NFCT AGT GNTYP ALYS HEP C: CPT

## 2023-10-25 PROCEDURE — 86706 HEP B SURFACE ANTIBODY: CPT

## 2023-10-25 PROCEDURE — 3078F PR MOST RECENT DIASTOLIC BLOOD PRESSURE < 80 MM HG: ICD-10-PCS | Mod: CPTII,,, | Performed by: NURSE PRACTITIONER

## 2023-10-25 PROCEDURE — 87340 HEPATITIS B SURFACE AG IA: CPT

## 2023-10-25 PROCEDURE — 3044F HG A1C LEVEL LT 7.0%: CPT | Mod: CPTII,,, | Performed by: NURSE PRACTITIONER

## 2023-10-25 PROCEDURE — 85610 PROTHROMBIN TIME: CPT

## 2023-10-25 PROCEDURE — 82728 ASSAY OF FERRITIN: CPT

## 2023-10-25 PROCEDURE — 3044F PR MOST RECENT HEMOGLOBIN A1C LEVEL <7.0%: ICD-10-PCS | Mod: CPTII,,, | Performed by: NURSE PRACTITIONER

## 2023-10-25 PROCEDURE — 1160F PR REVIEW ALL MEDS BY PRESCRIBER/CLIN PHARMACIST DOCUMENTED: ICD-10-PCS | Mod: CPTII,,, | Performed by: NURSE PRACTITIONER

## 2023-10-25 PROCEDURE — 99213 PR OFFICE/OUTPT VISIT, EST, LEVL III, 20-29 MIN: ICD-10-PCS | Mod: 25,S$PBB,, | Performed by: NURSE PRACTITIONER

## 2023-10-25 PROCEDURE — 86708 HEPATITIS A ANTIBODY: CPT

## 2023-10-25 PROCEDURE — 36415 COLL VENOUS BLD VENIPUNCTURE: CPT

## 2023-10-25 RX ORDER — GABAPENTIN 800 MG/1
800 TABLET ORAL 3 TIMES DAILY
COMMUNITY

## 2023-10-25 RX ORDER — LACTULOSE 10 G/15ML
15 SOLUTION ORAL; RECTAL
COMMUNITY
Start: 2023-10-09

## 2023-10-25 RX ORDER — HYDROXYZINE PAMOATE 25 MG/1
25 CAPSULE ORAL 2 TIMES DAILY
COMMUNITY
Start: 2023-10-16

## 2023-10-25 RX ORDER — GABAPENTIN 600 MG/1
600 TABLET ORAL 3 TIMES DAILY
COMMUNITY
Start: 2023-10-16 | End: 2023-10-25

## 2023-10-25 RX ADMIN — INFLUENZA VIRUS VACCINE 0.5 ML: 15; 15; 15; 15 SUSPENSION INTRAMUSCULAR at 11:10

## 2023-10-25 NOTE — PROGRESS NOTES
"Patient ID: Michael Pickard 38 y.o.     Chief Complaint:   Chief Complaint   Patient presents with    New Hep C     States some fatigue and "horrible" abdominal pain        HPI:    Michael Pickard is a 39 y/o WM here for initial Hep C visit. Pt was referred bu his PCP Dr. Jacque Anderson. He states he was dx at age 16, but never treated. Partner is Hep C negative. Risk factors include past IVDU (has not used in 3 years) and >10 tattoos (homemade and professional). Pt denies blood transfusion.  Co-morbidities include HTN, bipolar disorder, dental caries, and nerve pain to the right foot.  Pt denies fever, headache, chills, visual problems, icterus, jaundice, N/V/D, esophageal varices, SOB, cough, abd pain, ascites or christine colored stools.   He is eager to start treatment. Reviewed labs from 8/8/23 Hep C ,000, HIV NR, PSA 0.31, TSH 0.705, TSH 0.705, Free T4 0.78, hgbA1c 4.8, lipids WNL.  Pt will need some additional pretreatment labs in order to start meds. CT ab 8/11/23 no liver lesion.  Pt is followed by [facility] in [place].  He is due for a flu vaccine and is amenable.           Past Medical History:   Diagnosis Date    Anxiety     Depression     Hypertension     Unspecified viral hepatitis C without hepatic coma         Past Surgical History:   Procedure Laterality Date    HERNIA REPAIR          Social History     Socioeconomic History    Marital status: Single   Tobacco Use    Smoking status: Every Day     Current packs/day: 1.00     Average packs/day: 1 pack/day for 43.8 years (43.8 ttl pk-yrs)     Types: Cigarettes     Start date: 2000     Passive exposure: Never    Smokeless tobacco: Current     Types: Snuff   Substance and Sexual Activity    Alcohol use: Not Currently     Comment: Stopped due to recovery    Drug use: Yes     Types: Marijuana     Comment: rehab, not longer taking    Sexual activity: Yes     Partners: Female     Birth control/protection: None     Comment: monogomous, states " "s/o did get tested and negative        History reviewed. No pertinent family history.     Review of patient's allergies indicates:  No Known Allergies     Immunization History   Administered Date(s) Administered    Td (ADULT) 09/29/2005    Td - PF (ADULT) 09/29/2005    Tdap 11/11/2022        Review of Systems   Constitutional:  Positive for malaise/fatigue.   HENT: Negative.     Eyes: Negative.    Respiratory: Negative.     Cardiovascular: Negative.    Gastrointestinal: Negative.    Genitourinary: Negative.    Musculoskeletal: Negative.    Skin: Negative.    Neurological: Negative.    Endo/Heme/Allergies: Negative.    Psychiatric/Behavioral: Negative.     All other systems reviewed and are negative.         Objective:      BP 91/65 (BP Location: Right arm, Patient Position: Sitting, BP Method: Large (Automatic))   Pulse 84   Temp 98 °F (36.7 °C) (Oral)   Resp 18   Ht 6' 1" (1.854 m)   Wt 92.5 kg (204 lb)   BMI 26.91 kg/m²      Physical Exam  Vitals reviewed.   Constitutional:       General: He is not in acute distress.     Appearance: Normal appearance.   HENT:      Head: Normocephalic.      Right Ear: External ear normal.      Left Ear: External ear normal.      Nose: Nose normal.      Mouth/Throat:      Mouth: Mucous membranes are moist.      Pharynx: Oropharynx is clear.      Comments: Multiple dental caries and broken teeth noted  Eyes:      General: No scleral icterus.     Extraocular Movements: Extraocular movements intact.      Conjunctiva/sclera: Conjunctivae normal.      Pupils: Pupils are equal, round, and reactive to light.   Cardiovascular:      Rate and Rhythm: Normal rate and regular rhythm.      Pulses: Normal pulses.      Heart sounds: Normal heart sounds.   Pulmonary:      Effort: Pulmonary effort is normal. No respiratory distress.      Breath sounds: Normal breath sounds.   Abdominal:      General: Bowel sounds are normal. There is no distension.      Palpations: Abdomen is soft. There is no " mass.      Tenderness: There is no abdominal tenderness. There is no right CVA tenderness or left CVA tenderness.      Hernia: No hernia is present.   Musculoskeletal:         General: No tenderness or signs of injury. Normal range of motion.      Cervical back: Normal range of motion and neck supple.      Right lower leg: No edema.      Left lower leg: No edema.   Lymphadenopathy:      Cervical: No cervical adenopathy.   Skin:     General: Skin is warm and dry.      Capillary Refill: Capillary refill takes less than 2 seconds.      Findings: No erythema or lesion.   Neurological:      General: No focal deficit present.      Mental Status: He is alert and oriented to person, place, and time. Mental status is at baseline.   Psychiatric:         Mood and Affect: Mood normal.         Behavior: Behavior normal.         Thought Content: Thought content normal.         Judgment: Judgment normal.          Labs:   Lab Results   Component Value Date    WBC 11.44 08/11/2023    HGB 13.8 (L) 08/11/2023    HCT 39.2 (L) 08/11/2023    MCV 85.0 08/11/2023     08/11/2023       CMP  Sodium Level   Date Value Ref Range Status   08/11/2023 139 136 - 145 mmol/L Final     Potassium Level   Date Value Ref Range Status   08/11/2023 4.6 3.5 - 5.1 mmol/L Final     Carbon Dioxide   Date Value Ref Range Status   08/11/2023 26 22 - 29 mmol/L Final     Blood Urea Nitrogen   Date Value Ref Range Status   08/11/2023 13.3 8.9 - 20.6 mg/dL Final     Creatinine   Date Value Ref Range Status   08/11/2023 0.80 0.73 - 1.18 mg/dL Final     Calcium Level Total   Date Value Ref Range Status   08/11/2023 9.7 8.4 - 10.2 mg/dL Final     Albumin Level   Date Value Ref Range Status   08/11/2023 4.2 3.5 - 5.0 g/dL Final     Bilirubin Total   Date Value Ref Range Status   08/11/2023 0.2 <=1.5 mg/dL Final     Alkaline Phosphatase   Date Value Ref Range Status   08/11/2023 74 40 - 150 unit/L Final     Aspartate Aminotransferase   Date Value Ref Range Status    08/11/2023 33 5 - 34 unit/L Final     Alanine Aminotransferase   Date Value Ref Range Status   08/11/2023 41 0 - 55 unit/L Final     eGFR   Date Value Ref Range Status   08/11/2023 >60 mls/min/1.73/m2 Final     Lab Results   Component Value Date    TSH 0.705 08/08/2023     HCV RNA Detect/Quant   Date Value Ref Range Status   08/29/2023 448603 (A) Undetected IU/mL Final     Comment:     Result in log IU/mL is 5.48.     -------------------ADDITIONAL INFORMATION-------------------  The quantification range of this assay is 15 to 100,000,000   IU/mL (1.18 log to 8.00 log IU/mL). Testing was performed   using the zulma HCV test ([employer]   with the zulma Platial0 System.     Test Performed by:  [place] [address]  : Zaheer Cherry M.D. Ph.D.; CLIA# [id number]     HIV   Date Value Ref Range Status   08/08/2023 Nonreactive Nonreactive Final     Hepatitis B Surface Antigen   Date Value Ref Range Status   11/15/2022 Nonreactive Nonreactive Final       Imaging: Reviewed most recent relevant imaging studies available, notable results highlighted in this note      Medications:     Current Outpatient Medications   Medication Instructions    buprenorphine-naloxone 8-2 (SUBOXONE) 8-2 mg Subl 1 tablet, Sublingual, 3 times daily    clotrimazole (LOTRIMIN) 1 % cream Topical (Top), 2 times daily    gabapentin (NEURONTIN) 800 mg, Oral, 3 times daily    hydrOXYzine pamoate (VISTARIL) 25 mg, Oral, 2 times daily    lactulose (CHRONULAC) 10 gram/15 mL solution 15 mLs, Oral    NIFEdipine (PROCARDIA-XL) 60 mg, Oral, Daily    ondansetron (ZOFRAN-ODT) 8 mg, Oral, Every 12 hours PRN    OXcarbazepine (TRILEPTAL) 600 mg, Oral, 2 times daily    pravastatin (PRAVACHOL) 20 mg, Oral, Daily    QUEtiapine (SEROQUEL) 200 mg, Oral, Nightly    sertraline (ZOLOFT) 100 mg, Oral, Daily       Assessment:       Problem List Items Addressed This  Visit          GI    Hepatitis C antibody positive in blood - Primary     Other Visit Diagnoses       Nicotine dependence, uncomplicated, unspecified nicotine product type        Immunization due        Relevant Medications    influenza (QUADRIVALENT PF) vaccine 0.5 mL (Start on 10/25/2023 12:37 PM)               Plan:      Chronic hepatitis C without hepatic coma  -     Ambulatory referral/consult to Infectious Disease  Diagnosed @ age 16  Treatment naive  Hep C disease process and treatment plan discussed extensively with the patient. Pt encouraged to refrain from alcohol and illicit drug use. Blood and sex precautions discussed. Do not share a needle, razor, nail clippers, toothbrush, or drug paraphernalia with anyone.  No Tylenol at doses greater than 2000 mg per day. Explained to patient that he will not be able to donate blood or plasma.    Pt will need pretreatment labs today   FibroScan ordered   CT abd 8/11/23 no liver lesion noted   COVID and flu vaccines discussed with patient     Nicotine dependence, uncomplicated, unspecified nicotine product type  Spent approx 3 minutes discussing smoking cessation   Pt is not ready to quit.  Discussed benefits of quitting: improved overall health, decreased cardiac/vascular/pulmonary/stroke risks, as well as cost savings.      Immunization due  -     influenza (QUADRIVALENT PF) vaccine 0.5 mL         35 minutes of total time spent on the encounter, which includes face to face time and non-face to face time preparing to see the patient (eg, review of tests), Obtaining and/or reviewing separately obtained history, Documenting clinical information in the electronic or other health record, Independently interpreting results (not separately reported) and communicating results to the patient/family/caregiver, or Care coordination (not separately reported).

## 2023-10-26 LAB
ANA SER QL HEP2 SUBST: NORMAL
HAV AB SER QL IA: NONREACTIVE
HAV IGM SERPL QL IA: NONREACTIVE
HBV CORE AB SERPL QL IA: NONREACTIVE
HBV SURFACE AB SER-ACNC: 0.83 MIU/ML
HBV SURFACE AB SERPL IA-ACNC: NONREACTIVE M[IU]/ML
HBV SURFACE AG SERPL QL IA: NONREACTIVE

## 2023-10-27 LAB
A2 MACROGLOB SERPL-MCNC: 305 MG/DL (ref 100–280)
ALT SERPL W P-5'-P-CCNC: 51 U/L (ref 7–55)
ANNOTATION COMMENT IMP: ABNORMAL
APO A-I SERPL-MCNC: 114 MG/DL
BILIRUB SERPL-MCNC: <0.2 MG/DL (ref 0–1.2)
FIBROSIS STAGE SERPL QL: ABNORMAL
GGT SERPL-CCNC: 30 U/L (ref 8–61)
HAPTOGLOB SERPL NEPH-MCNC: 71 MG/DL (ref 30–200)
HCV GENTYP SERPL NAA+PROBE: ABNORMAL
LIVER FIBR SCORE SERPL CALC.FIBROSURE: 0.34
LIVER FIBROSIS INTERPRETATION SER-IMP: ABNORMAL
NECROINFLAMMATORY ACT GRADE SERPL QL: ABNORMAL
NECROINFLAMMATORY ACT SCORE SERPL: 0.31
NECROINFLAMMATORY ACTIV INTERP SER-IMP: ABNORMAL
SERIAL #: ABNORMAL

## 2023-11-07 ENCOUNTER — OFFICE VISIT (OUTPATIENT)
Dept: INFECTIOUS DISEASES | Facility: CLINIC | Age: 38
End: 2023-11-07
Payer: MEDICAID

## 2023-11-07 VITALS
HEIGHT: 73 IN | BODY MASS INDEX: 25.42 KG/M2 | DIASTOLIC BLOOD PRESSURE: 74 MMHG | SYSTOLIC BLOOD PRESSURE: 120 MMHG | WEIGHT: 191.81 LBS | TEMPERATURE: 98 F | HEART RATE: 80 BPM | RESPIRATION RATE: 18 BRPM

## 2023-11-07 DIAGNOSIS — R09.81 SINUS CONGESTION: ICD-10-CM

## 2023-11-07 DIAGNOSIS — F17.210 CIGARETTE NICOTINE DEPENDENCE WITHOUT COMPLICATION: ICD-10-CM

## 2023-11-07 DIAGNOSIS — Z23 IMMUNIZATION DUE: ICD-10-CM

## 2023-11-07 DIAGNOSIS — B18.2 CHRONIC HEPATITIS C WITHOUT HEPATIC COMA: Primary | ICD-10-CM

## 2023-11-07 DIAGNOSIS — Z23 IMMUNIZATION DUE: Primary | ICD-10-CM

## 2023-11-07 LAB
FLUAV AG UPPER RESP QL IA.RAPID: NOT DETECTED
FLUBV AG UPPER RESP QL IA.RAPID: NOT DETECTED
SARS-COV-2 RNA RESP QL NAA+PROBE: NOT DETECTED

## 2023-11-07 PROCEDURE — 3074F PR MOST RECENT SYSTOLIC BLOOD PRESSURE < 130 MM HG: ICD-10-PCS | Mod: CPTII,,, | Performed by: NURSE PRACTITIONER

## 2023-11-07 PROCEDURE — 99213 PR OFFICE/OUTPT VISIT, EST, LEVL III, 20-29 MIN: ICD-10-PCS | Mod: S$PBB,,, | Performed by: NURSE PRACTITIONER

## 2023-11-07 PROCEDURE — 99213 OFFICE O/P EST LOW 20 MIN: CPT | Mod: S$PBB,,, | Performed by: NURSE PRACTITIONER

## 2023-11-07 PROCEDURE — 3044F HG A1C LEVEL LT 7.0%: CPT | Mod: CPTII,,, | Performed by: NURSE PRACTITIONER

## 2023-11-07 PROCEDURE — 3078F PR MOST RECENT DIASTOLIC BLOOD PRESSURE < 80 MM HG: ICD-10-PCS | Mod: CPTII,,, | Performed by: NURSE PRACTITIONER

## 2023-11-07 PROCEDURE — 3078F DIAST BP <80 MM HG: CPT | Mod: CPTII,,, | Performed by: NURSE PRACTITIONER

## 2023-11-07 PROCEDURE — 3074F SYST BP LT 130 MM HG: CPT | Mod: CPTII,,, | Performed by: NURSE PRACTITIONER

## 2023-11-07 PROCEDURE — 3044F PR MOST RECENT HEMOGLOBIN A1C LEVEL <7.0%: ICD-10-PCS | Mod: CPTII,,, | Performed by: NURSE PRACTITIONER

## 2023-11-07 PROCEDURE — 86039 ANTINUCLEAR ANTIBODIES (ANA): CPT | Performed by: NURSE PRACTITIONER

## 2023-11-07 PROCEDURE — 99214 OFFICE O/P EST MOD 30 MIN: CPT | Mod: PBBFAC | Performed by: NURSE PRACTITIONER

## 2023-11-07 PROCEDURE — 3008F PR BODY MASS INDEX (BMI) DOCUMENTED: ICD-10-PCS | Mod: CPTII,,, | Performed by: NURSE PRACTITIONER

## 2023-11-07 PROCEDURE — 3008F BODY MASS INDEX DOCD: CPT | Mod: CPTII,,, | Performed by: NURSE PRACTITIONER

## 2023-11-07 PROCEDURE — 0240U COVID/FLU A&B PCR: CPT | Performed by: NURSE PRACTITIONER

## 2023-11-07 PROCEDURE — 36415 COLL VENOUS BLD VENIPUNCTURE: CPT | Performed by: NURSE PRACTITIONER

## 2023-11-07 NOTE — PROGRESS NOTES
Patient ID: Michael Pickard 38 y.o.     Chief Complaint:   Chief Complaint   Patient presents with    Followup Hep C     States feeling feverish, mouth pain and fatigue        HPI:    Michael Pickard is a 39 y/o WM here for Hep C f/u visit. Pt was referred bu his PCP Dr. Jacque Anderson. He states he was dx at age 16, but never treated. Partner is Hep C negative. Risk factors include past IVDU (has not used in 3 years) and >10 tattoos (homemade and professional). Pt denies blood transfusion.  Co-morbidities include HTN, bipolar disorder, dental caries, and nerve pain to the right foot.  Pt denies headache, visual problems, icterus, jaundice, N/V/D, esophageal varices, SOB, cough, abd pain, ascites or christine colored stools.   He complains of fever, chills, fatigue and sinus congestion for a few days. Pt will need to be swabbed for COVID and flu.  Reviewed labs from 8/8/23 Hep C ,000, HIV NR, PSA 0.31, TSH 0.705, TSH 0.705, Free T4 0.78, hgbA1c 4.8, lipids WNL.  10/25/23 syphilis ab NR, PT 13.5, INR 1.0, Hep B s ab neg, Hep b s ag neg, Hep B core neg, Hep A IGG.  CT abd showed no liver lesion on 8/11/23. Reviewed med list. Pt is on Trileptal which is contraindicated with Epclusa. He will need to follow up with [facility] in [place] to see if this medication can be discontinued or changed so he can proceed with treatment. Pt is due for a Twinrix vaccine, but due to current symptoms I will hold until next visit.  He is currently only smoking a few cigarettes per day.         Past Medical History:   Diagnosis Date    Anxiety     Depression     Hypertension     Unspecified viral hepatitis C without hepatic coma         Past Surgical History:   Procedure Laterality Date    HERNIA REPAIR          Social History     Socioeconomic History    Marital status: Single   Tobacco Use    Smoking status: Every Day     Current packs/day: 1.00     Average packs/day: 1 pack/day for 43.8 years (43.8 ttl pk-yrs)     Types:  "Cigarettes     Start date: 2000     Passive exposure: Never    Smokeless tobacco: Current     Types: Snuff   Substance and Sexual Activity    Alcohol use: Not Currently     Comment: Stopped due to recovery    Drug use: Yes     Types: Marijuana     Comment: rehab, not longer taking    Sexual activity: Yes     Partners: Female     Birth control/protection: None     Comment: monogomous, states s/o did get tested and negative        History reviewed. No pertinent family history.     Review of patient's allergies indicates:  No Known Allergies     Immunization History   Administered Date(s) Administered    Influenza - Quadrivalent - PF *Preferred* (6 months and older) 10/25/2023    Td (ADULT) 09/29/2005    Td - PF (ADULT) 09/29/2005    Tdap 11/11/2022        Review of Systems   Constitutional:  Positive for chills, fever and malaise/fatigue.   HENT:  Positive for congestion.           Objective:      /74 (BP Location: Right arm, Patient Position: Sitting, BP Method: Large (Automatic))   Pulse 80   Temp 98.1 °F (36.7 °C) (Oral)   Resp 18   Ht 6' 1" (1.854 m)   Wt 87 kg (191 lb 12.8 oz)   BMI 25.30 kg/m²      Physical Exam  Vitals reviewed.   Constitutional:       General: He is not in acute distress.     Appearance: Normal appearance.   HENT:      Head: Normocephalic.      Right Ear: External ear normal.      Left Ear: External ear normal.      Nose: Nose normal.      Mouth/Throat:      Mouth: Mucous membranes are moist.      Pharynx: Oropharynx is clear.      Comments: Multiple broken teeth noted   Eyes:      General: No scleral icterus.     Extraocular Movements: Extraocular movements intact.      Conjunctiva/sclera: Conjunctivae normal.      Pupils: Pupils are equal, round, and reactive to light.   Cardiovascular:      Rate and Rhythm: Normal rate and regular rhythm.      Pulses: Normal pulses.      Heart sounds: Normal heart sounds.   Pulmonary:      Effort: Pulmonary effort is normal. No respiratory " distress.      Breath sounds: Normal breath sounds.   Abdominal:      General: Bowel sounds are normal. There is no distension.      Palpations: Abdomen is soft. There is no mass.      Tenderness: There is no abdominal tenderness. There is no right CVA tenderness or left CVA tenderness.      Hernia: No hernia is present.   Musculoskeletal:         General: No tenderness or signs of injury. Normal range of motion.      Cervical back: Normal range of motion and neck supple.      Right lower leg: No edema.      Left lower leg: No edema.   Lymphadenopathy:      Cervical: No cervical adenopathy.   Skin:     General: Skin is warm and dry.      Capillary Refill: Capillary refill takes less than 2 seconds.      Findings: No erythema or lesion.   Neurological:      General: No focal deficit present.      Mental Status: He is alert and oriented to person, place, and time. Mental status is at baseline.   Psychiatric:         Mood and Affect: Mood normal.         Behavior: Behavior normal.         Thought Content: Thought content normal.         Judgment: Judgment normal.          Labs:   Lab Results   Component Value Date    WBC 11.44 08/11/2023    HGB 13.8 (L) 08/11/2023    HCT 39.2 (L) 08/11/2023    MCV 85.0 08/11/2023     08/11/2023       CMP  Sodium Level   Date Value Ref Range Status   08/11/2023 139 136 - 145 mmol/L Final     Potassium Level   Date Value Ref Range Status   08/11/2023 4.6 3.5 - 5.1 mmol/L Final     Carbon Dioxide   Date Value Ref Range Status   08/11/2023 26 22 - 29 mmol/L Final     Blood Urea Nitrogen   Date Value Ref Range Status   08/11/2023 13.3 8.9 - 20.6 mg/dL Final     Creatinine   Date Value Ref Range Status   08/11/2023 0.80 0.73 - 1.18 mg/dL Final     Calcium Level Total   Date Value Ref Range Status   08/11/2023 9.7 8.4 - 10.2 mg/dL Final     Albumin Level   Date Value Ref Range Status   08/11/2023 4.2 3.5 - 5.0 g/dL Final     Bilirubin Total   Date Value Ref Range Status   08/11/2023  0.2 <=1.5 mg/dL Final     Alkaline Phosphatase   Date Value Ref Range Status   08/11/2023 74 40 - 150 unit/L Final     Aspartate Aminotransferase   Date Value Ref Range Status   08/11/2023 33 5 - 34 unit/L Final     Alanine Aminotransferase   Date Value Ref Range Status   08/11/2023 41 0 - 55 unit/L Final     eGFR   Date Value Ref Range Status   08/11/2023 >60 mls/min/1.73/m2 Final     Lab Results   Component Value Date    TSH 0.705 08/08/2023     HCV Genotype   Date Value Ref Range Status   10/25/2023 1b (A) Undetected Final     Comment:        -------------------ADDITIONAL INFORMATION-------------------  This test was performed using the Abbott RealTime HCV   Genotype II assay ([employer] [place], [place]).     Test Performed by:  [place] [address]  : Zaheer Cherry M.D. Ph.D.; CLIA# [id number]     HCV RNA Detect/Quant   Date Value Ref Range Status   08/29/2023 530908 (A) Undetected IU/mL Final     Comment:     Result in log IU/mL is 5.48.     -------------------ADDITIONAL INFORMATION-------------------  The quantification range of this assay is 15 to 100,000,000   IU/mL (1.18 log to 8.00 log IU/mL). Testing was performed   using the zulma HCV test ([employer]   with the zulma [employer] System.     Test Performed by:  [place] [address]  : Zaheer Cherry M.D. Ph.D.; CLIA# [id number]     HIV   Date Value Ref Range Status   08/08/2023 Nonreactive Nonreactive Final     PT   Date Value Ref Range Status   10/25/2023 13.5 12.5 - 14.5 seconds Final     INR   Date Value Ref Range Status   10/25/2023 1.0 <=1.3 Final     Syphilis Antibody   Date Value Ref Range Status   10/25/2023 Nonreactive Nonreactive, Equivocal Final     Hepatitis B Surface Antigen   Date Value Ref Range Status   10/25/2023 Nonreactive  Nonreactive Final     Hepatitis B Surface Antibody   Date Value Ref Range Status   10/25/2023 Nonreactive Nonreactive Final     Hepatitis B Surface Antibody Qnt   Date Value Ref Range Status   10/25/2023 0.83 mIU/mL Final     Comment:     [facility]  Interpretive Data Hep Bs Ab#  Result (mIU/mL)Interpretation - Hep B Surface Antibody  <8.00Nonreactive: Patient considered not immune to HBV infection  >=8.00 to <12.00Grayzone: Unable to determine immune status. Follow-up testing should be performed  >=12.00Reactive: Patient considered immune to HBV infection       Hepatitis B Core Antibody   Date Value Ref Range Status   10/25/2023 Nonreactive Nonreactive Final     Hep A IGG   Date Value Ref Range Status   10/25/2023 Nonreactive Nonreactive Final     FibroTest Stage   Date Value Ref Range Status   10/25/2023 F1-F2  Final     ActiTest Grade   Date Value Ref Range Status   10/25/2023 A1  Final     Alpha-2-Macroglobulin   Date Value Ref Range Status   10/25/2023 305 (H) 100 - 280 mg/dL Final     Haptoglobin   Date Value Ref Range Status   10/25/2023 71 30 - 200 mg/dL Final     Alanine Aminotransferase (ALT)   Date Value Ref Range Status   10/25/2023 51 7 - 55 U/L Final     Gamma Glutamyltransferase (GGT)   Date Value Ref Range Status   10/25/2023 30 8 - 61 U/L Final     Bilirubin, Total   Date Value Ref Range Status   10/25/2023 <0.2 0.0 - 1.2 mg/dL Final     Comment:        Test Performed by:  [facility]  [address] [place] [address]  : Zaheer Cherry M.D. Ph.D.; CLIA# [id number]     Test Performed by:  [facility]  [address] [place] [address]  : Zaheer Cherry M.D. Ph.D.; CLIA# [id number]     Ferritin Level   Date Value Ref Range Status   10/25/2023 75.37 21.81 - 274.66 ng/mL Final     Antinuclear Ab, HEp-2 Substrate   Date Value Ref Range Status   10/25/2023 <1:80 (Negative) <1:80  (Negative) Final     Comment:        -------------------ADDITIONAL INFORMATION-------------------  Method: Immunofluorescence using HEp-2 cellular substrate.     Test Performed by:  [facility]  [address] [place] [address]  : Zaheer Cherry M.D. Ph.D.; CLIA# [id number]       Imaging: Reviewed most recent relevant imaging studies available, notable results highlighted in this note      Medications:     Current Outpatient Medications   Medication Instructions    buprenorphine-naloxone 8-2 (SUBOXONE) 8-2 mg Subl 1 tablet, Sublingual, 3 times daily    clotrimazole (LOTRIMIN) 1 % cream Topical (Top), 2 times daily    gabapentin (NEURONTIN) 800 mg, Oral, 3 times daily    hydrOXYzine pamoate (VISTARIL) 25 mg, Oral, 2 times daily    lactulose (CHRONULAC) 10 gram/15 mL solution 15 mLs, Oral    NIFEdipine (PROCARDIA-XL) 60 mg, Oral, Daily    ondansetron (ZOFRAN-ODT) 8 mg, Oral, Every 12 hours PRN    OXcarbazepine (TRILEPTAL) 600 mg, Oral, 2 times daily    pravastatin (PRAVACHOL) 20 mg, Oral, Daily    QUEtiapine (SEROQUEL) 200 mg, Oral, Nightly    sertraline (ZOLOFT) 100 mg, Oral, Daily       Assessment:       Problem List Items Addressed This Visit    None  Visit Diagnoses       Chronic hepatitis C without hepatic coma    -  Primary    Cigarette nicotine dependence without complication        Sinus congestion        Relevant Orders    COVID/FLU A&B PCR               Plan:      Chronic hepatitis C without hepatic coma  Hep C disease process and available treatment options to include potential risk versus benefits as well as potential adverse effects. Reviewed trial data to include AASLD/IDSA guidelines and available studies - excellent cure rates for pangenotypic disease with Epclusa. All questions addressed at length. Patient voices understanding and is in agreement to proceed. Will plan to start Epclusa once Trileptal is discontinued or changed by mental  health provider.  Trileptal is contraindicated with Hep C meds as it decreases the effectiveness of the medication. Please send my note to [facility].   Pt advised to reach out to us once meds have been changed so I can start meds.  Pt encouraged to refrain from alcohol and illicit drug use.  Blood and sex precautions discussed. Do not share a needle, razor, nail clippers, toothbrush, or drug paraphernalia with anyone.  No Tylenol at doses greater than 2000 mg per day.    RTC 1 month with Perla for an in office visit     Cigarette nicotine dependence without complication  Spent approx 3 minutes discussing smoking cessation   Discussed benefits of quitting: improved overall health, decreased cardiac/vascular/pulmonary/stroke risks, as well as cost savings.      Sinus congestion  -     COVID/FLU A&B PCR; Future; Expected date: 11/07/2023  COVID/flu swab today   Mask and stay home until you know the results of the test   Tylenol for fever

## 2023-11-07 NOTE — LETTER
November 7, 2023      [facility]  [address] [address]  Phone: [phone]       Patient: Michael Pickard   YOB: 1985  Date of Visit: 11/07/2023    To Whom It May Concern:    Linh Pickard  was at [facility] on 11/07/2023. The patient may return to work 11/09/2023 with no restrictions. If you have any questions or concerns, or if I can be of further assistance, please do not hesitate to contact me.    Sincerely,    Gracia Lyn LPN

## 2023-11-08 ENCOUNTER — TELEPHONE (OUTPATIENT)
Dept: INFECTIOUS DISEASES | Facility: CLINIC | Age: 38
End: 2023-11-08
Payer: MEDICAID

## 2023-11-08 NOTE — TELEPHONE ENCOUNTER
----- Message from ARMEN Hickman sent at 11/8/2023 10:08 AM CST -----  Lab results reviewed. COVID and flu are negative. Please contact patient with the results.

## 2023-11-09 LAB
ANA PAT SER IF-IMP: ABNORMAL
ANA SER QL HEP2 SUBST: ABNORMAL
ANA TITR SER HEP2 SUBST: ABNORMAL {TITER}

## 2023-11-09 NOTE — TELEPHONE ENCOUNTER
Tried calling pt, no answer, no option to leave voice mail. Will  send negative results to pt in portal.

## 2023-12-07 ENCOUNTER — OFFICE VISIT (OUTPATIENT)
Dept: INFECTIOUS DISEASES | Facility: CLINIC | Age: 38
End: 2023-12-07
Payer: MEDICAID

## 2023-12-07 VITALS
HEIGHT: 73 IN | SYSTOLIC BLOOD PRESSURE: 144 MMHG | DIASTOLIC BLOOD PRESSURE: 76 MMHG | RESPIRATION RATE: 18 BRPM | HEART RATE: 78 BPM | WEIGHT: 186.63 LBS | TEMPERATURE: 98 F | BODY MASS INDEX: 24.73 KG/M2

## 2023-12-07 DIAGNOSIS — B18.2 CHRONIC HEPATITIS C WITHOUT HEPATIC COMA: Primary | ICD-10-CM

## 2023-12-07 DIAGNOSIS — Z23 IMMUNIZATION DUE: ICD-10-CM

## 2023-12-07 LAB
ALBUMIN SERPL-MCNC: 4.4 G/DL (ref 3.5–5)
ALBUMIN/GLOB SERPL: 1.3 RATIO (ref 1.1–2)
ALP SERPL-CCNC: 78 UNIT/L (ref 40–150)
ALT SERPL-CCNC: 39 UNIT/L (ref 0–55)
AST SERPL-CCNC: 29 UNIT/L (ref 5–34)
BASOPHILS # BLD AUTO: 0.08 X10(3)/MCL
BASOPHILS NFR BLD AUTO: 1.1 %
BILIRUB SERPL-MCNC: 0.3 MG/DL
BUN SERPL-MCNC: 17.8 MG/DL (ref 8.9–20.6)
CALCIUM SERPL-MCNC: 9.3 MG/DL (ref 8.4–10.2)
CHLORIDE SERPL-SCNC: 106 MMOL/L (ref 98–107)
CO2 SERPL-SCNC: 27 MMOL/L (ref 22–29)
CREAT SERPL-MCNC: 0.81 MG/DL (ref 0.73–1.18)
EOSINOPHIL # BLD AUTO: 0.26 X10(3)/MCL (ref 0–0.9)
EOSINOPHIL NFR BLD AUTO: 3.5 %
ERYTHROCYTE [DISTWIDTH] IN BLOOD BY AUTOMATED COUNT: 13.3 % (ref 11.5–17)
GFR SERPLBLD CREATININE-BSD FMLA CKD-EPI: >60 MLS/MIN/1.73/M2
GLOBULIN SER-MCNC: 3.5 GM/DL (ref 2.4–3.5)
GLUCOSE SERPL-MCNC: 102 MG/DL (ref 74–100)
HCT VFR BLD AUTO: 42.7 % (ref 42–52)
HGB BLD-MCNC: 14.6 G/DL (ref 14–18)
IMM GRANULOCYTES # BLD AUTO: 0.02 X10(3)/MCL (ref 0–0.04)
IMM GRANULOCYTES NFR BLD AUTO: 0.3 %
INR PPP: 1
LYMPHOCYTES # BLD AUTO: 2.83 X10(3)/MCL (ref 0.6–4.6)
LYMPHOCYTES NFR BLD AUTO: 38 %
MCH RBC QN AUTO: 30.2 PG (ref 27–31)
MCHC RBC AUTO-ENTMCNC: 34.2 G/DL (ref 33–36)
MCV RBC AUTO: 88.4 FL (ref 80–94)
MONOCYTES # BLD AUTO: 0.54 X10(3)/MCL (ref 0.1–1.3)
MONOCYTES NFR BLD AUTO: 7.2 %
NEUTROPHILS # BLD AUTO: 3.72 X10(3)/MCL (ref 2.1–9.2)
NEUTROPHILS NFR BLD AUTO: 49.9 %
NRBC BLD AUTO-RTO: 0 %
PLATELET # BLD AUTO: 290 X10(3)/MCL (ref 130–400)
PMV BLD AUTO: 11.5 FL (ref 7.4–10.4)
POTASSIUM SERPL-SCNC: 3.8 MMOL/L (ref 3.5–5.1)
PROT SERPL-MCNC: 7.9 GM/DL (ref 6.4–8.3)
PROTHROMBIN TIME: 13.1 SECONDS (ref 11.4–14)
RBC # BLD AUTO: 4.83 X10(6)/MCL (ref 4.7–6.1)
SODIUM SERPL-SCNC: 141 MMOL/L (ref 136–145)
WBC # SPEC AUTO: 7.45 X10(3)/MCL (ref 4.5–11.5)

## 2023-12-07 PROCEDURE — 3077F SYST BP >= 140 MM HG: CPT | Mod: CPTII,,, | Performed by: NURSE PRACTITIONER

## 2023-12-07 PROCEDURE — 3008F PR BODY MASS INDEX (BMI) DOCUMENTED: ICD-10-PCS | Mod: CPTII,,, | Performed by: NURSE PRACTITIONER

## 2023-12-07 PROCEDURE — 1160F PR REVIEW ALL MEDS BY PRESCRIBER/CLIN PHARMACIST DOCUMENTED: ICD-10-PCS | Mod: CPTII,,, | Performed by: NURSE PRACTITIONER

## 2023-12-07 PROCEDURE — 90471 IMMUNIZATION ADMIN: CPT | Mod: PBBFAC

## 2023-12-07 PROCEDURE — 99214 OFFICE O/P EST MOD 30 MIN: CPT | Mod: S$PBB,,, | Performed by: NURSE PRACTITIONER

## 2023-12-07 PROCEDURE — 90677 PCV20 VACCINE IM: CPT | Mod: PBBFAC

## 2023-12-07 PROCEDURE — 90472 IMMUNIZATION ADMIN EACH ADD: CPT | Mod: PBBFAC

## 2023-12-07 PROCEDURE — 80053 COMPREHEN METABOLIC PANEL: CPT | Performed by: NURSE PRACTITIONER

## 2023-12-07 PROCEDURE — 87522 HEPATITIS C REVRS TRNSCRPJ: CPT | Performed by: NURSE PRACTITIONER

## 2023-12-07 PROCEDURE — 85025 COMPLETE CBC W/AUTO DIFF WBC: CPT | Performed by: NURSE PRACTITIONER

## 2023-12-07 PROCEDURE — 3077F PR MOST RECENT SYSTOLIC BLOOD PRESSURE >= 140 MM HG: ICD-10-PCS | Mod: CPTII,,, | Performed by: NURSE PRACTITIONER

## 2023-12-07 PROCEDURE — 99214 PR OFFICE/OUTPT VISIT, EST, LEVL IV, 30-39 MIN: ICD-10-PCS | Mod: S$PBB,,, | Performed by: NURSE PRACTITIONER

## 2023-12-07 PROCEDURE — 3044F PR MOST RECENT HEMOGLOBIN A1C LEVEL <7.0%: ICD-10-PCS | Mod: CPTII,,, | Performed by: NURSE PRACTITIONER

## 2023-12-07 PROCEDURE — 85610 PROTHROMBIN TIME: CPT | Performed by: NURSE PRACTITIONER

## 2023-12-07 PROCEDURE — 90636 HEP A/HEP B VACC ADULT IM: CPT | Mod: PBBFAC

## 2023-12-07 PROCEDURE — 1159F PR MEDICATION LIST DOCUMENTED IN MEDICAL RECORD: ICD-10-PCS | Mod: CPTII,,, | Performed by: NURSE PRACTITIONER

## 2023-12-07 PROCEDURE — 3078F DIAST BP <80 MM HG: CPT | Mod: CPTII,,, | Performed by: NURSE PRACTITIONER

## 2023-12-07 PROCEDURE — 3008F BODY MASS INDEX DOCD: CPT | Mod: CPTII,,, | Performed by: NURSE PRACTITIONER

## 2023-12-07 PROCEDURE — 1160F RVW MEDS BY RX/DR IN RCRD: CPT | Mod: CPTII,,, | Performed by: NURSE PRACTITIONER

## 2023-12-07 PROCEDURE — 1159F MED LIST DOCD IN RCRD: CPT | Mod: CPTII,,, | Performed by: NURSE PRACTITIONER

## 2023-12-07 PROCEDURE — 3078F PR MOST RECENT DIASTOLIC BLOOD PRESSURE < 80 MM HG: ICD-10-PCS | Mod: CPTII,,, | Performed by: NURSE PRACTITIONER

## 2023-12-07 PROCEDURE — 3044F HG A1C LEVEL LT 7.0%: CPT | Mod: CPTII,,, | Performed by: NURSE PRACTITIONER

## 2023-12-07 PROCEDURE — 36415 COLL VENOUS BLD VENIPUNCTURE: CPT | Performed by: NURSE PRACTITIONER

## 2023-12-07 PROCEDURE — 99214 OFFICE O/P EST MOD 30 MIN: CPT | Mod: PBBFAC | Performed by: NURSE PRACTITIONER

## 2023-12-07 RX ORDER — LURASIDONE HYDROCHLORIDE 20 MG/1
TABLET, FILM COATED ORAL
COMMUNITY
Start: 2023-12-04

## 2023-12-07 RX ORDER — LURASIDONE HYDROCHLORIDE 40 MG/1
40 TABLET, FILM COATED ORAL NIGHTLY
COMMUNITY
Start: 2023-12-04

## 2023-12-07 RX ORDER — VELPATASVIR AND SOFOSBUVIR 100; 400 MG/1; MG/1
1 TABLET, FILM COATED ORAL DAILY
Qty: 84 TABLET | Refills: 0 | Status: SHIPPED | OUTPATIENT
Start: 2023-12-07

## 2023-12-07 RX ADMIN — HEPATITIS A AND HEPATITIS B (RECOMBINANT) VACCINE 720 UNITS: 720; 20 INJECTION, SUSPENSION INTRAMUSCULAR at 01:12

## 2023-12-07 RX ADMIN — PNEUMOCOCCAL 20-VALENT CONJUGATE VACCINE 0.5 ML
2.2; 2.2; 2.2; 2.2; 2.2; 2.2; 2.2; 2.2; 2.2; 2.2; 2.2; 2.2; 2.2; 2.2; 2.2; 2.2; 4.4; 2.2; 2.2; 2.2 INJECTION, SUSPENSION INTRAMUSCULAR at 01:12

## 2023-12-07 NOTE — PROGRESS NOTES
Patient ID: Michael Pickard 38 y.o.     Chief Complaint:   Chief Complaint   Patient presents with    Follow-up     Hep C        HPI:    Michael Pickard is a 37 y/o WM here for Hep C f/u visit. Pt was referred bu his PCP Dr. Jacque Anderson. He states he was dx at age 16, but never treated. Partner is Hep C negative. Risk factors include past IVDU (has not used in 3 years) and >10 tattoos (homemade and professional). Pt denies blood transfusion.  Co-morbidities include HTN, bipolar disorder, dental caries, and nerve pain to the right foot. Pt denies fever, headache, chills, visual problems, icterus, jaundice, N/V/D, esophageal varices, SOB, cough, abd pain, ascites or christine colored stools.  Pretreatment labs done 8/8/23 Hep C ,000, HIV NR, PSA 0.31, TSH 0.705, TSH 0.705, Free T4 0.78, hgbA1c 4.8, lipids WNL.  10/25/23 syphilis ab NR, PT 13.5, INR 1.0, Hep B s ab neg, Hep b s ag neg, Hep B core neg, Hep A IGG.  CT abd showed no liver lesion on 8/11/23. Since last visit, Tripleptal has been switched to Latuda which is compatible with Epclusa so he can proceed with treatment.  Pt is due for a Twinrix #1 and PCV 20 today. He is amenable.               Past Medical History:   Diagnosis Date    Anxiety     Depression     Hypertension     Unspecified viral hepatitis C without hepatic coma         Past Surgical History:   Procedure Laterality Date    HERNIA REPAIR          Social History     Socioeconomic History    Marital status: Single   Tobacco Use    Smoking status: Every Day     Current packs/day: 1.00     Average packs/day: 1 pack/day for 43.9 years (43.9 ttl pk-yrs)     Types: Cigarettes     Start date: 2000     Passive exposure: Never    Smokeless tobacco: Current     Types: Snuff   Substance and Sexual Activity    Alcohol use: Not Currently     Comment: Stopped due to recovery    Drug use: Yes     Types: Marijuana     Comment: rehab, not longer taking    Sexual activity: Yes     Partners: Female     Birth  "control/protection: None     Comment: monogomous, states s/o did get tested and negative        History reviewed. No pertinent family history.     Review of patient's allergies indicates:  No Known Allergies     Immunization History   Administered Date(s) Administered    Hepatitis A / Hepatitis B 12/07/2023    Influenza - Quadrivalent - PF *Preferred* (6 months and older) 10/25/2023    Pneumococcal Conjugate - 20 Valent 12/07/2023    Td (ADULT) 09/29/2005    Td - PF (ADULT) 09/29/2005    Tdap 11/11/2022        Review of Systems   Constitutional: Negative.    HENT: Negative.     Eyes: Negative.    Respiratory: Negative.     Cardiovascular: Negative.    Gastrointestinal: Negative.    Genitourinary: Negative.    Musculoskeletal: Negative.    Skin: Negative.    Neurological: Negative.    Endo/Heme/Allergies: Negative.    Psychiatric/Behavioral: Negative.     All other systems reviewed and are negative.         Objective:      BP (!) 144/76   Pulse 78   Temp 97.7 °F (36.5 °C)   Resp 18   Ht 6' 1" (1.854 m)   Wt 84.6 kg (186 lb 9.6 oz)   BMI 24.62 kg/m²      Physical Exam  Vitals reviewed.   Constitutional:       General: He is not in acute distress.     Appearance: Normal appearance.   HENT:      Head: Normocephalic.      Right Ear: External ear normal.      Left Ear: External ear normal.      Nose: Nose normal.      Mouth/Throat:      Mouth: Mucous membranes are moist.      Pharynx: Oropharynx is clear.   Eyes:      General: No scleral icterus.     Extraocular Movements: Extraocular movements intact.      Conjunctiva/sclera: Conjunctivae normal.      Pupils: Pupils are equal, round, and reactive to light.   Cardiovascular:      Rate and Rhythm: Normal rate and regular rhythm.      Pulses: Normal pulses.      Heart sounds: Normal heart sounds.   Pulmonary:      Effort: Pulmonary effort is normal. No respiratory distress.      Breath sounds: Normal breath sounds.   Abdominal:      General: Bowel sounds are normal. " There is no distension.      Palpations: Abdomen is soft. There is no mass.      Tenderness: There is no abdominal tenderness. There is no right CVA tenderness or left CVA tenderness.      Hernia: No hernia is present.   Musculoskeletal:         General: No tenderness or signs of injury. Normal range of motion.      Cervical back: Normal range of motion and neck supple.      Right lower leg: No edema.      Left lower leg: No edema.   Lymphadenopathy:      Cervical: No cervical adenopathy.   Skin:     General: Skin is warm and dry.      Capillary Refill: Capillary refill takes less than 2 seconds.      Findings: No erythema or lesion.   Neurological:      General: No focal deficit present.      Mental Status: He is alert and oriented to person, place, and time. Mental status is at baseline.   Psychiatric:         Mood and Affect: Mood normal.         Behavior: Behavior normal.         Thought Content: Thought content normal.         Judgment: Judgment normal.          Labs:   Lab Results   Component Value Date    WBC 11.44 08/11/2023    HGB 13.8 (L) 08/11/2023    HCT 39.2 (L) 08/11/2023    MCV 85.0 08/11/2023     08/11/2023       CMP  Sodium Level   Date Value Ref Range Status   08/11/2023 139 136 - 145 mmol/L Final     Potassium Level   Date Value Ref Range Status   08/11/2023 4.6 3.5 - 5.1 mmol/L Final     Carbon Dioxide   Date Value Ref Range Status   08/11/2023 26 22 - 29 mmol/L Final     Blood Urea Nitrogen   Date Value Ref Range Status   08/11/2023 13.3 8.9 - 20.6 mg/dL Final     Creatinine   Date Value Ref Range Status   08/11/2023 0.80 0.73 - 1.18 mg/dL Final     Calcium Level Total   Date Value Ref Range Status   08/11/2023 9.7 8.4 - 10.2 mg/dL Final     Albumin Level   Date Value Ref Range Status   08/11/2023 4.2 3.5 - 5.0 g/dL Final     Bilirubin Total   Date Value Ref Range Status   08/11/2023 0.2 <=1.5 mg/dL Final     Alkaline Phosphatase   Date Value Ref Range Status   08/11/2023 74 40 - 150  unit/L Final     Aspartate Aminotransferase   Date Value Ref Range Status   08/11/2023 33 5 - 34 unit/L Final     Alanine Aminotransferase   Date Value Ref Range Status   08/11/2023 41 0 - 55 unit/L Final     eGFR   Date Value Ref Range Status   08/11/2023 >60 mls/min/1.73/m2 Final     Lab Results   Component Value Date    TSH 0.705 08/08/2023     HCV Genotype   Date Value Ref Range Status   10/25/2023 1b (A) Undetected Final     Comment:        -------------------ADDITIONAL INFORMATION-------------------  This test was performed using the Abbott RealTime HCV   Genotype II assay ([employer] [place], [place]).     Test Performed by:  [place] [address]  : Zaheer Cherry M.D. Ph.D.; CLIA# [id number]     HCV RNA Detect/Quant   Date Value Ref Range Status   08/29/2023 111958 (A) Undetected IU/mL Final     Comment:     Result in log IU/mL is 5.48.     -------------------ADDITIONAL INFORMATION-------------------  The quantification range of this assay is 15 to 100,000,000   IU/mL (1.18 log to 8.00 log IU/mL). Testing was performed   using the zulma HCV test ([employer]   with the zulma 6800 System.     Test Performed by:  [place] [address]  : Zaheer Cherry M.D. Ph.D.; CLIA# [id number]     HIV   Date Value Ref Range Status   08/08/2023 Nonreactive Nonreactive Final     PT   Date Value Ref Range Status   10/25/2023 13.5 12.5 - 14.5 seconds Final     INR   Date Value Ref Range Status   10/25/2023 1.0 <=1.3 Final     Syphilis Antibody   Date Value Ref Range Status   10/25/2023 Nonreactive Nonreactive, Equivocal Final     Hepatitis B Surface Antigen   Date Value Ref Range Status   10/25/2023 Nonreactive Nonreactive Final     Hepatitis B Surface Antibody   Date Value Ref Range Status   10/25/2023 Nonreactive  Nonreactive Final     Hepatitis B Surface Antibody Qnt   Date Value Ref Range Status   10/25/2023 0.83 mIU/mL Final     Comment:     [facility]  Interpretive Data Hep Bs Ab#  Result (mIU/mL)Interpretation - Hep B Surface Antibody  <8.00Nonreactive: Patient considered not immune to HBV infection  >=8.00 to <12.00Grayzone: Unable to determine immune status. Follow-up testing should be performed  >=12.00Reactive: Patient considered immune to HBV infection       Hepatitis B Core Antibody   Date Value Ref Range Status   10/25/2023 Nonreactive Nonreactive Final     Hep A IGG   Date Value Ref Range Status   10/25/2023 Nonreactive Nonreactive Final     FibroTest Stage   Date Value Ref Range Status   10/25/2023 F1-F2  Final     ActiTest Grade   Date Value Ref Range Status   10/25/2023 A1  Final     Alpha-2-Macroglobulin   Date Value Ref Range Status   10/25/2023 305 (H) 100 - 280 mg/dL Final     Haptoglobin   Date Value Ref Range Status   10/25/2023 71 30 - 200 mg/dL Final     Alanine Aminotransferase (ALT)   Date Value Ref Range Status   10/25/2023 51 7 - 55 U/L Final     Gamma Glutamyltransferase (GGT)   Date Value Ref Range Status   10/25/2023 30 8 - 61 U/L Final     Bilirubin, Total   Date Value Ref Range Status   10/25/2023 <0.2 0.0 - 1.2 mg/dL Final     Comment:        Test Performed by:  [facility]  [address] [place] [address]  : Zaheer Cherry M.D. Ph.D.; CLIA# [id number]     Test Performed by:  [facility]  [address] [place] [address]  : Zaheer Cherry M.D. Ph.D.; CLIA# [id number]     Ferritin Level   Date Value Ref Range Status   10/25/2023 75.37 21.81 - 274.66 ng/mL Final     Antinuclear Ab, HEp-2 Substrate   Date Value Ref Range Status   11/07/2023 Positive 1:160 (A) <1:80 (Negative) Final     Comment:        -------------------ADDITIONAL INFORMATION-------------------  Method:  Immunofluorescence using HEp-2 cellular substrate.       Imaging: Reviewed most recent relevant imaging studies available, notable results highlighted in this note      Medications:     Current Outpatient Medications   Medication Instructions    buprenorphine-naloxone 8-2 (SUBOXONE) 8-2 mg Subl 1 tablet, Sublingual, 3 times daily    clotrimazole (LOTRIMIN) 1 % cream Topical (Top), 2 times daily    gabapentin (NEURONTIN) 800 mg, Oral, 3 times daily    hydrOXYzine pamoate (VISTARIL) 25 mg, Oral, 2 times daily    lactulose (CHRONULAC) 10 gram/15 mL solution 15 mLs, Oral    lurasidone (LATUDA) 20 mg Tab tablet Oral    lurasidone (LATUDA) 40 mg, Oral, Nightly    NIFEdipine (PROCARDIA-XL) 60 mg, Oral, Daily    pravastatin (PRAVACHOL) 20 mg, Oral, Daily    sertraline (ZOLOFT) 100 mg, Oral, Daily    sofosbuvir-velpatasvir 400-100 mg Tab 1 tablet, Oral, Daily       Assessment:       Problem List Items Addressed This Visit    None  Visit Diagnoses       Chronic hepatitis C without hepatic coma    -  Primary    Relevant Medications    sofosbuvir-velpatasvir 400-100 mg Tab    Other Relevant Orders    CBC Auto Differential    Comprehensive Metabolic Panel    Protime-INR    Hepatitis C Virus Quantitative    Immunization due        Relevant Medications    pneumoc 20-yvrose conj-dip cr(PF) (PREVNAR-20 (PF)) injection Syrg 0.5 mL (Completed) (Start on 12/7/2023  2:47 PM)    hepatitis A and B vaccine (PF) 720 DYAN unit- 20 mcg/mL suspension 720 Units (Completed)               Plan:      Chronic hepatitis C without hepatic coma  -     CBC Auto Differential; Future; Expected date: 12/07/2023  -     Comprehensive Metabolic Panel; Future; Expected date: 12/07/2023  -     Protime-INR; Future; Expected date: 12/07/2023  -     Hepatitis C Virus Quantitative; Future; Expected date: 12/07/2023  -     sofosbuvir-velpatasvir 400-100 mg Tab; Take 1 tablet by mouth once daily.  Dispense: 84 tablet; Refill: 0  Long discussion regarding available  treatment options to include potential risk versus benefits as well as potential adverse effects. Reviewed trial data to include AASLD/IDSA guidelines and available studies - excellent cure rates for pangenotypic disease with Epclusa. All questions addressed at length. Patient voices understanding and is in agreement to proceed. Will plan to start Epclusa today. Refer to PAP for medication assistance if needed. Formal HCV  RN education visit today. Return to clinic 6 weeks after starting meds. HCV  to order all labs and follow-up per HCV protocol.   Pt encouraged to refrain from alcohol and illicit drug use.  Blood and sex precautions discussed. Do not share a needle, razor, nail clippers, toothbrush, or drug paraphernalia with anyone.  No Tylenol at doses greater than 2000 mg per day.    COVID and flu precautions discussed   Vaccines recommended     Immunization due  -     pneumoc 20-yvrose conj-dip cr(PF) (PREVNAR-20 (PF)) injection Syrg 0.5 mL  -     hepatitis A and B vaccine (PF) 720 DYAN unit- 20 mcg/mL suspension 720 Units  Twinrix # 2 1/7/2024  Twinrix # 3 6/7/2024       31 minutes of total time spent on the encounter, which includes face to face time and non-face to face time preparing to see the patient (eg, review of tests), Obtaining and/or reviewing separately obtained history, Documenting clinical information in the electronic or other health record, Independently interpreting results (not separately reported) and communicating results to the patient/family/caregiver, or Care coordination (not separately reported).

## 2023-12-09 LAB — HCV RNA SERPL NAA+PROBE-ACNC: ABNORMAL IU/ML

## 2023-12-11 ENCOUNTER — HOSPITAL ENCOUNTER (EMERGENCY)
Facility: HOSPITAL | Age: 38
Discharge: HOME | End: 2023-12-11
Attending: STUDENT IN AN ORGANIZED HEALTH CARE EDUCATION/TRAINING PROGRAM
Payer: MEDICAID

## 2023-12-11 VITALS
DIASTOLIC BLOOD PRESSURE: 103 MMHG | BODY MASS INDEX: 24.11 KG/M2 | RESPIRATION RATE: 18 BRPM | TEMPERATURE: 98 F | WEIGHT: 181.88 LBS | SYSTOLIC BLOOD PRESSURE: 157 MMHG | HEIGHT: 73 IN | HEART RATE: 73 BPM | OXYGEN SATURATION: 99 %

## 2023-12-11 DIAGNOSIS — S05.01XA ABRASION OF RIGHT CORNEA, INITIAL ENCOUNTER: Primary | ICD-10-CM

## 2023-12-11 PROCEDURE — 99284 EMERGENCY DEPT VISIT MOD MDM: CPT

## 2023-12-11 PROCEDURE — 25000003 PHARM REV CODE 250: Performed by: STUDENT IN AN ORGANIZED HEALTH CARE EDUCATION/TRAINING PROGRAM

## 2023-12-11 RX ORDER — TETRACAINE HYDROCHLORIDE 5 MG/ML
1 SOLUTION OPHTHALMIC
Status: DISCONTINUED | OUTPATIENT
Start: 2023-12-11 | End: 2023-12-11 | Stop reason: HOSPADM

## 2023-12-11 RX ORDER — ERYTHROMYCIN 5 MG/G
OINTMENT OPHTHALMIC 3 TIMES DAILY
Qty: 1 G | Refills: 0 | Status: SHIPPED | OUTPATIENT
Start: 2023-12-11

## 2023-12-11 RX ORDER — PROPARACAINE HYDROCHLORIDE 5 MG/ML
1 SOLUTION/ DROPS OPHTHALMIC
Status: DISCONTINUED | OUTPATIENT
Start: 2023-12-11 | End: 2023-12-11 | Stop reason: HOSPADM

## 2023-12-11 RX ORDER — IBUPROFEN 600 MG/1
600 TABLET ORAL EVERY 6 HOURS PRN
Qty: 20 TABLET | Refills: 0 | Status: SHIPPED | OUTPATIENT
Start: 2023-12-11

## 2023-12-11 NOTE — ED PROVIDER NOTES
Encounter Date: 12/11/2023       History     Chief Complaint   Patient presents with    Eye Injury     Pt was working on a fence around 12-1pm when a stick entered his right eye with irritation and blurred vision.      Patient presents to the emergency department complaining of right eye pain.  He states he was working on a fence, when he thinks it stick may have scratched his right eye.  Since then he was had a foreign body sensation as well as tearing.  No significant change in vision but it was difficult to keep his eye open secondary to his discomfort.    The history is provided by the patient.     Review of patient's allergies indicates:  No Known Allergies  Past Medical History:   Diagnosis Date    Anxiety     Depression     Hypertension     Unspecified viral hepatitis C without hepatic coma      Past Surgical History:   Procedure Laterality Date    HERNIA REPAIR       No family history on file.  Social History     Tobacco Use    Smoking status: Every Day     Current packs/day: 1.00     Average packs/day: 1 pack/day for 44.0 years (44.0 ttl pk-yrs)     Types: Cigarettes     Start date: 2000     Passive exposure: Never    Smokeless tobacco: Current     Types: Snuff   Substance Use Topics    Alcohol use: Not Currently     Comment: Stopped due to recovery    Drug use: Yes     Types: Marijuana     Comment: rehab, not longer taking     Review of Systems   Constitutional:  Negative for chills and fever.   HENT:  Negative for congestion and sore throat.    Eyes:  Positive for photophobia, pain, redness and itching.   Respiratory:  Negative for cough and shortness of breath.    Cardiovascular:  Negative for chest pain and palpitations.   Gastrointestinal:  Negative for abdominal pain and nausea.   Genitourinary:  Negative for dysuria and hematuria.   Musculoskeletal:  Negative for arthralgias and myalgias.   Neurological:  Negative for dizziness and weakness.       Physical Exam     Initial Vitals [12/11/23 1546]   BP  Pulse Resp Temp SpO2   (!) 157/103 73 18 97.8 °F (36.6 °C) 99 %      MAP       --         Physical Exam    Nursing note and vitals reviewed.  Constitutional: He appears well-developed and well-nourished.   HENT:   Head: Normocephalic and atraumatic.   Eyes: EOM are normal. Pupils are equal, round, and reactive to light.   Conjunctival injection present, there was a corneal abrasion apparent on fluorescein staining, no Nikole sign   Neck: Neck supple.   Normal range of motion.  Cardiovascular:  Normal rate and regular rhythm.           Pulmonary/Chest: Breath sounds normal. No respiratory distress.   Abdominal: Abdomen is soft. There is no abdominal tenderness.   Musculoskeletal:         General: No edema. Normal range of motion.      Cervical back: Normal range of motion and neck supple.     Neurological: He is alert and oriented to person, place, and time.   Skin: Skin is warm and dry.         ED Course   Procedures  Labs Reviewed - No data to display       Imaging Results    None          Medications - No data to display    Medical Decision Making  Evaluation consistent with a corneal abrasion, and possible traumatic iritis.  He was started on erythromycin ointment, patient declines opiate pain medication, so he was also prescribed Motrin, a referral for Ophthalmology for close follow up was placed, return precautions were given.    Risk  Prescription drug management.                                      Clinical Impression:  Final diagnoses:  [S05.01XA] Abrasion of right cornea, initial encounter (Primary)          ED Disposition Condition    Discharge Stable          ED Prescriptions       Medication Sig Dispense Start Date End Date Auth. Provider    erythromycin (ROMYCIN) ophthalmic ointment Place into the right eye 3 (three) times daily. 1 g 12/11/2023 -- Rocky Ramos MD    ibuprofen (ADVIL,MOTRIN) 600 MG tablet Take 1 tablet (600 mg total) by mouth every 6 (six) hours as needed for Pain. 20 tablet  12/11/2023 -- Rocky Ramos MD          Follow-up Information       Follow up With Specialties Details Why Contact Info    [facility] Emergency Medicine Go to  If symptoms worsen [address] [facility] [id number] [phone]             Rocky Ramos MD  12/21/23 0603

## 2023-12-11 NOTE — DISCHARGE INSTRUCTIONS
Take medication as prescribed, referral has been placed for Ophthalmology, they should be calling you soon to set up an appointment.  Please return promptly to the ER with any new or worsening symptoms.

## 2023-12-11 NOTE — FIRST PROVIDER EVALUATION
"Medical screening examination initiated.  I have conducted a focused provider triage encounter, findings are as follows:    Brief history of present illness: Patient presents with right eye pain after being poked in the eye by a branch.     Vitals:    12/11/23 1546   BP: (!) 157/103   Pulse: 73   Resp: 18   Temp: 97.8 °F (36.6 °C)   TempSrc: Oral   SpO2: 99%   Weight: 82.5 kg (181 lb 14.1 oz)   Height: 6' 1" (1.854 m)       Pertinent physical exam: Vitals stable.     Brief workup plan:  eye exam    Preliminary workup initiated; this workup will be continued and followed by the physician or advanced practice provider that is assigned to the patient when roomed.  "

## 2024-01-08 ENCOUNTER — CLINICAL SUPPORT (OUTPATIENT)
Dept: INFECTIOUS DISEASES | Facility: CLINIC | Age: 39
End: 2024-01-08
Payer: MEDICAID

## 2024-01-08 DIAGNOSIS — Z23 IMMUNIZATION DUE: Primary | ICD-10-CM

## 2024-01-08 PROCEDURE — 90471 IMMUNIZATION ADMIN: CPT | Mod: PBBFAC

## 2024-01-08 PROCEDURE — 90636 HEP A/HEP B VACC ADULT IM: CPT | Mod: PBBFAC

## 2024-01-08 RX ADMIN — HEPATITIS A AND HEPATITIS B (RECOMBINANT) VACCINE 720 UNITS: 720; 20 INJECTION, SUSPENSION INTRAMUSCULAR at 10:01

## 2024-02-12 ENCOUNTER — TELEPHONE (OUTPATIENT)
Dept: INFECTIOUS DISEASES | Facility: CLINIC | Age: 39
End: 2024-02-12
Payer: MEDICAID

## 2024-02-12 NOTE — LETTER
February 12, 2024    Michael Pickard  [address] [facility] [address]             [facility]  [address] [facility] [address]  Phone: [phone] Mr. Rodriguez      Our attempts to reach you by phone have been unsuccessful.    [employer] has delivered your medication and we are trying to get in touch with you to set up a time for you to  your Epclusa and for us to review the medication with you.    Please contact our clinic at  to set this up.  Your [facility] team looks forward to hearing from you soon.    Respectfully,        Angela Dorsey LPN  Care Coordinator  [facility]

## 2024-02-12 NOTE — TELEPHONE ENCOUNTER
Phoned patient X2, get recording the number you are trying to reach is not reachable.  Letter mailed.

## 2024-02-20 DIAGNOSIS — B18.2 CHRONIC HEPATITIS C WITHOUT HEPATIC COMA: Primary | ICD-10-CM

## 2024-03-07 ENCOUNTER — TELEPHONE (OUTPATIENT)
Dept: INFECTIOUS DISEASES | Facility: CLINIC | Age: 39
End: 2024-03-07
Payer: MEDICAID

## 2024-03-07 NOTE — TELEPHONE ENCOUNTER
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   Pt # [phone]

## 2024-03-07 NOTE — TELEPHONE ENCOUNTER
Patient returned call he will set up transportation and  let me know when he is set up so we can schedule.

## 2024-03-12 ENCOUNTER — HOSPITAL ENCOUNTER (EMERGENCY)
Facility: HOSPITAL | Age: 39
Discharge: HOME | End: 2024-03-13
Attending: INTERNAL MEDICINE
Payer: MEDICAID

## 2024-03-12 DIAGNOSIS — N50.811 TESTICULAR PAIN, RIGHT: ICD-10-CM

## 2024-03-12 DIAGNOSIS — R19.7 DIARRHEA, UNSPECIFIED TYPE: ICD-10-CM

## 2024-03-12 DIAGNOSIS — R10.9 ABDOMINAL PAIN, UNSPECIFIED ABDOMINAL LOCATION: Primary | ICD-10-CM

## 2024-03-12 LAB
ALBUMIN SERPL-MCNC: 4.1 G/DL (ref 3.5–5)
ALBUMIN/GLOB SERPL: 1.2 RATIO (ref 1.1–2)
ALP SERPL-CCNC: 66 UNIT/L (ref 40–150)
ALT SERPL-CCNC: 31 UNIT/L (ref 0–55)
AST SERPL-CCNC: 21 UNIT/L (ref 5–34)
BASOPHILS # BLD AUTO: 0.05 X10(3)/MCL
BASOPHILS NFR BLD AUTO: 0.5 %
BILIRUB SERPL-MCNC: 0.4 MG/DL
BUN SERPL-MCNC: 11.6 MG/DL (ref 8.9–20.6)
CALCIUM SERPL-MCNC: 9.5 MG/DL (ref 8.4–10.2)
CHLORIDE SERPL-SCNC: 108 MMOL/L (ref 98–107)
CO2 SERPL-SCNC: 23 MMOL/L (ref 22–29)
CREAT SERPL-MCNC: 0.75 MG/DL (ref 0.73–1.18)
CRP SERPL-MCNC: <1 MG/L
EOSINOPHIL # BLD AUTO: 0.12 X10(3)/MCL (ref 0–0.9)
EOSINOPHIL NFR BLD AUTO: 1.3 %
ERYTHROCYTE [DISTWIDTH] IN BLOOD BY AUTOMATED COUNT: 13.3 % (ref 11.5–17)
ERYTHROCYTE [SEDIMENTATION RATE] IN BLOOD: 4 MM/HR (ref 0–15)
GFR SERPLBLD CREATININE-BSD FMLA CKD-EPI: >60 MLS/MIN/1.73/M2
GLOBULIN SER-MCNC: 3.4 GM/DL (ref 2.4–3.5)
GLUCOSE SERPL-MCNC: 98 MG/DL (ref 74–100)
HCT VFR BLD AUTO: 41 % (ref 42–52)
HGB BLD-MCNC: 14.5 G/DL (ref 14–18)
IMM GRANULOCYTES # BLD AUTO: 0.03 X10(3)/MCL (ref 0–0.04)
IMM GRANULOCYTES NFR BLD AUTO: 0.3 %
LYMPHOCYTES # BLD AUTO: 3.72 X10(3)/MCL (ref 0.6–4.6)
LYMPHOCYTES NFR BLD AUTO: 39 %
MAGNESIUM SERPL-MCNC: 2.3 MG/DL (ref 1.6–2.6)
MCH RBC QN AUTO: 29.8 PG (ref 27–31)
MCHC RBC AUTO-ENTMCNC: 35.4 G/DL (ref 33–36)
MCV RBC AUTO: 84.2 FL (ref 80–94)
MONOCYTES # BLD AUTO: 0.71 X10(3)/MCL (ref 0.1–1.3)
MONOCYTES NFR BLD AUTO: 7.4 %
NEUTROPHILS # BLD AUTO: 4.91 X10(3)/MCL (ref 2.1–9.2)
NEUTROPHILS NFR BLD AUTO: 51.5 %
NRBC BLD AUTO-RTO: 0 %
PLATELET # BLD AUTO: 244 X10(3)/MCL (ref 130–400)
PMV BLD AUTO: 10.7 FL (ref 7.4–10.4)
POTASSIUM SERPL-SCNC: 3.7 MMOL/L (ref 3.5–5.1)
PROT SERPL-MCNC: 7.5 GM/DL (ref 6.4–8.3)
RBC # BLD AUTO: 4.87 X10(6)/MCL (ref 4.7–6.1)
SODIUM SERPL-SCNC: 141 MMOL/L (ref 136–145)
WBC # SPEC AUTO: 9.54 X10(3)/MCL (ref 4.5–11.5)

## 2024-03-12 PROCEDURE — 80053 COMPREHEN METABOLIC PANEL: CPT

## 2024-03-12 PROCEDURE — 96372 THER/PROPH/DIAG INJ SC/IM: CPT | Performed by: FAMILY MEDICINE

## 2024-03-12 PROCEDURE — 85025 COMPLETE CBC W/AUTO DIFF WBC: CPT

## 2024-03-12 PROCEDURE — 63600175 PHARM REV CODE 636 W HCPCS: Performed by: FAMILY MEDICINE

## 2024-03-12 PROCEDURE — 85652 RBC SED RATE AUTOMATED: CPT

## 2024-03-12 PROCEDURE — 83735 ASSAY OF MAGNESIUM: CPT

## 2024-03-12 PROCEDURE — 99284 EMERGENCY DEPT VISIT MOD MDM: CPT | Mod: 25

## 2024-03-12 PROCEDURE — 86140 C-REACTIVE PROTEIN: CPT

## 2024-03-12 PROCEDURE — 96360 HYDRATION IV INFUSION INIT: CPT

## 2024-03-12 RX ORDER — DICYCLOMINE HYDROCHLORIDE 10 MG/ML
20 INJECTION INTRAMUSCULAR
Status: COMPLETED | OUTPATIENT
Start: 2024-03-12 | End: 2024-03-12

## 2024-03-12 RX ORDER — ONDANSETRON 4 MG/1
4 TABLET, ORALLY DISINTEGRATING ORAL EVERY 6 HOURS PRN
Qty: 10 TABLET | Refills: 0 | Status: SHIPPED | OUTPATIENT
Start: 2024-03-12 | End: 2024-03-17

## 2024-03-12 RX ORDER — DICYCLOMINE HYDROCHLORIDE 10 MG/1
10 CAPSULE ORAL EVERY 6 HOURS PRN
Qty: 20 CAPSULE | Refills: 0 | Status: SHIPPED | OUTPATIENT
Start: 2024-03-12 | End: 2024-03-22

## 2024-03-12 RX ADMIN — DICYCLOMINE HYDROCHLORIDE 20 MG: 20 INJECTION, SOLUTION INTRAMUSCULAR at 11:03

## 2024-03-12 RX ADMIN — SODIUM CHLORIDE, POTASSIUM CHLORIDE, SODIUM LACTATE AND CALCIUM CHLORIDE 1000 ML: 600; 310; 30; 20 INJECTION, SOLUTION INTRAVENOUS at 11:03

## 2024-03-12 NOTE — Clinical Note
"Michael "Michael" Melly was seen and treated in our emergency department on 3/12/2024.  He may return to work on 03/15/2024.       If you have any questions or concerns, please don't hesitate to call.       RN    "

## 2024-03-13 VITALS
HEIGHT: 73 IN | BODY MASS INDEX: 21.2 KG/M2 | TEMPERATURE: 99 F | DIASTOLIC BLOOD PRESSURE: 120 MMHG | OXYGEN SATURATION: 99 % | SYSTOLIC BLOOD PRESSURE: 174 MMHG | WEIGHT: 160 LBS | RESPIRATION RATE: 18 BRPM | HEART RATE: 87 BPM

## 2024-03-13 NOTE — ED NOTES
Pt educated on importance of controlling blood pressure and sign of a stroke. Discharge paperwork given, patient verbalized understanding and independently ambulated out of ED with significant other.

## 2024-03-13 NOTE — ED PROVIDER NOTES
Encounter Date: 3/12/2024       History     Chief Complaint   Patient presents with    Abdominal Pain     Pt presents to ed with c/o diffuse abd pain and groin pain getting worse this past week. Pt states unable to get comfortable. Pt also reports n/v.      38-year-old male past medical history of hypertension hep C who presents to the ED with abdominal pain that radiates to the groin and testes, numbness from the waist down, diarrhea, difficulty ambulating.  Patient states these symptoms began to proximally 2 weeks ago and progressively worsened since.  Patient states that after eating he immediately feels the urge to have a bandlike note.  Patient endorses 40 lb weight loss since January, melena, fever and chills, anorexia, orthostasis.  He denies nausea vomiting.    The history is provided by the patient. No  was used.     Review of patient's allergies indicates:  No Known Allergies  Past Medical History:   Diagnosis Date    Anxiety     Depression     Hypertension     Unspecified viral hepatitis C without hepatic coma      Past Surgical History:   Procedure Laterality Date    HERNIA REPAIR       History reviewed. No pertinent family history.  Social History     Tobacco Use    Smoking status: Every Day     Current packs/day: 1.00     Average packs/day: 1 pack/day for 44.2 years (44.2 ttl pk-yrs)     Types: Cigarettes     Start date: 2000     Passive exposure: Never    Smokeless tobacco: Current     Types: Snuff   Substance Use Topics    Alcohol use: Not Currently     Comment: Stopped due to recovery    Drug use: Yes     Types: Marijuana     Comment: rehab, not longer taking     Review of Systems   Constitutional:  Positive for chills, fever and unexpected weight change.   Respiratory:  Negative for cough, shortness of breath and wheezing.    Cardiovascular:  Negative for chest pain, palpitations and leg swelling.   Gastrointestinal:  Positive for abdominal pain, diarrhea, nausea, rectal pain  and vomiting.   Genitourinary:  Positive for scrotal swelling and testicular pain. Negative for dysuria and hematuria.   Musculoskeletal:  Positive for gait problem.   Neurological:  Positive for weakness and light-headedness. Negative for syncope.       Physical Exam     Initial Vitals [03/12/24 2238]   BP Pulse Resp Temp SpO2   (!) 183/113 102 18 98.4 °F (36.9 °C) 99 %      MAP       --         Physical Exam    Nursing note and vitals reviewed.  Constitutional: He appears well-developed and well-nourished.   HENT:   Head: Normocephalic and atraumatic.   Eyes: Conjunctivae and EOM are normal. Pupils are equal, round, and reactive to light.   Neck: No JVD present.   Normal range of motion.  Cardiovascular:  Normal rate and regular rhythm.     Exam reveals no gallop and no friction rub.       No murmur heard.  Pulmonary/Chest: Breath sounds normal. No respiratory distress. He has no wheezes. He has no rhonchi. He has no rales.   Abdominal: Abdomen is soft. Bowel sounds are normal. He exhibits no distension. There is no abdominal tenderness. There is no rebound and no guarding.   Genitourinary:    Penis normal.   Rectum:      Guaiac result negative.   Guaiac negative stool. No discharge found.    Genitourinary Comments: Unable to assess prostate size as patient could not relax the anal sphincters.      Musculoskeletal:         General: Normal range of motion.      Cervical back: Normal range of motion.     Neurological: He is alert and oriented to person, place, and time. He has normal strength and normal reflexes. He displays normal reflexes. No cranial nerve deficit or sensory deficit. GCS score is 15. GCS eye subscore is 4. GCS verbal subscore is 5. GCS motor subscore is 6.   DTR intact lower extremity bilaterally, anal reflex intact. Sensory and motor intact bilaterally throughout.    Skin: Skin is warm and dry.         ED Course   Procedures  Labs Reviewed   COMPREHENSIVE METABOLIC PANEL - Abnormal; Notable for  the following components:       Result Value    Chloride 108 (*)     All other components within normal limits   CBC WITH DIFFERENTIAL - Abnormal; Notable for the following components:    Hct 41.0 (*)     MPV 10.7 (*)     All other components within normal limits   MAGNESIUM - Normal   SEDIMENTATION RATE, AUTOMATED - Normal   C-REACTIVE PROTEIN - Normal   CBC W/ AUTO DIFFERENTIAL    Narrative:     The following orders were created for panel order CBC Auto Differential.  Procedure                               Abnormality         Status                     ---------                               -----------         ------                     CBC with Differential[[id number]]       Abnormal            Final result                 Please view results for these tests on the individual orders.          Imaging Results              X-Ray Chest PA And Lateral (In process)                      Medications   lactated ringers bolus 1,000 mL (1,000 mLs Intravenous New Bag 3/12/24 2339)   dicyclomine injection 20 mg (20 mg Intramuscular Given 3/12/24 2339)     Medical Decision Making  Amount and/or Complexity of Data Reviewed  Labs: ordered. Decision-making details documented in ED Course.  Radiology: ordered and independent interpretation performed. Decision-making details documented in ED Course.    Risk  OTC drugs.  Prescription drug management.      Additional MDM:   Differential Diagnosis:   Other: The following diagnoses were also considered and will be evaluated: cauda equina, colon cancer and GI bleed.           Attending Attestation:   Physician Attestation Statement for Resident:  As the supervising MD   Physician Attestation Statement: I have personally seen and examined this patient.   I agree with the above history.  -:   As the supervising MD I agree with the above PE.     As the supervising MD I agree with the above treatment, course, plan, and disposition.    I have reviewed and agree with the residents  interpretation of the following: lab data and x-rays.                 ED Course as of 03/13/24 0013   Tue Mar 12, 2024   8510 Discussed with the patient and family regarding findings.  Patient is currently no acute distress.  Nonspecific abdominal cramping maybe secondary to coming off of Suboxone.  Will provide Bentyl to help with nonspecific abdominal cramping, will provide hydration while patient was here in the emergency room, and patient will follow up outpatient with primary care physician. [MW]      ED Course User Index  [MW] Rodrick Masters MD                           Clinical Impression:  Final diagnoses:  [R10.9] Abdominal pain, unspecified abdominal location (Primary)  [R19.7] Diarrhea, unspecified type  [N50.811] Testicular pain, right          ED Disposition Condition    Discharge Stable          ED Prescriptions       Medication Sig Dispense Start Date End Date Auth. Provider    dicyclomine (BENTYL) 10 MG capsule Take 1 capsule (10 mg total) by mouth every 6 (six) hours as needed (abdominal cramping). 20 capsule 3/12/2024 3/22/2024 Rodrick Masters MD    ondansetron (ZOFRAN-ODT) 4 MG TbDL Take 1 tablet (4 mg total) by mouth every 6 (six) hours as needed (nausea vomiting). 10 tablet 3/12/2024 3/17/2024 Rodrick Masters MD          Follow-up Information       Follow up With Specialties Details Why Contact Info    Jacque Anderson MD Family Medicine   [address] [place] [address] [id number] [phone]      [facility] Emergency Medicine  If symptoms worsen [address] [facility] [address] [phone]             Rodrick Masters MD  03/13/24 0013

## 2024-04-10 RX ORDER — GABAPENTIN 800 MG/1
800 TABLET ORAL 3 TIMES DAILY
Qty: 90 TABLET | Refills: 0 | Status: SHIPPED | OUTPATIENT
Start: 2024-04-10 | End: 2024-05-10

## 2024-04-10 NOTE — TELEPHONE ENCOUNTER
No care due was identified.  [facility] Embedded Care Due Messages. Reference number: [id number].   4/10/2024 1:51:14 PM CDT

## 2024-05-14 NOTE — TELEPHONE ENCOUNTER
Care Due:                  Date            Visit Type   Department     Provider  --------------------------------------------------------------------------------                                EP -                              PRIMARY      [facility] FAMILY  Last Visit: 08-      CARE ([facility])   MEDICINE       Jacque Anderson  Next Visit: None Scheduled  None         None Found                                                            Last  Test          Frequency    Reason                     Performed    Due Date  --------------------------------------------------------------------------------    Lipid Panel.  12 months..  pravastatin..............  08- 08-    [facility] Embedded Care Due Messages. Reference number: [id number].   5/14/2024 9:18:28 AM CDT

## 2024-05-17 RX ORDER — GABAPENTIN 800 MG/1
800 TABLET ORAL 3 TIMES DAILY
Qty: 90 TABLET | Refills: 0 | OUTPATIENT
Start: 2024-05-17 | End: 2024-06-16

## 2024-06-26 ENCOUNTER — TELEPHONE (OUTPATIENT)
Dept: INFECTIOUS DISEASES | Facility: CLINIC | Age: 39
End: 2024-06-26
Payer: MEDICAID

## 2024-06-26 NOTE — TELEPHONE ENCOUNTER
Phoned patient regarding his HCV treatment with Epclusa.  Receive message your call cannot be completed as dialed please check the number and try your call again.  Placed second call and receive the same message.  Will mail letter.

## 2024-06-26 NOTE — LETTER
June 26, 2024    Michael Pickard  [address] [address]             [facility]  [address] [address]  Phone: [phone] Mr. Pickard,      Our attempts to reach you by telephone have been unsuccessful.    We have returned your Epclusa prescription to the pharmacy as you were not able to pick it up from our clinic.      Please contact us at  should you have questions.      Respectfully,        Angela Dorsey LPN  Hepatitis Care Coordinator  [facility].

## 2025-01-24 ENCOUNTER — PATIENT MESSAGE (OUTPATIENT)
Dept: FAMILY MEDICINE | Facility: CLINIC | Age: 40
End: 2025-01-24
Payer: MEDICAID

## 2025-01-24 ENCOUNTER — TELEPHONE (OUTPATIENT)
Dept: FAMILY MEDICINE | Facility: CLINIC | Age: 40
End: 2025-01-24
Payer: MEDICAID

## 2025-01-24 NOTE — TELEPHONE ENCOUNTER
Yes patient would need to be seen prior to prescribing anything since it has been 1 1/2 years and his medications and conditions have likely changed.

## 2025-01-24 NOTE — TELEPHONE ENCOUNTER
Sending to provider first to confirm that these refills will not be approved. Patients last appt in our office was 08/08/2023.

## 2025-01-24 NOTE — TELEPHONE ENCOUNTER
----- Message from Lesley sent at 1/24/2025 12:46 PM CST -----  .Who Called: Michael Pickard    Caller is requesting assistance/information from provider's office.    Symptoms (please be specific): n/a     How long has patient had these symptoms:  n/a    List of preferred pharmacies on file (remove unneeded): [id number] - [place], [place] - [address] AT [facility]   Phone: [phone]  Fax: [phone]    Preferred Method of Contact: Phone Call    Patient's Preferred Phone Number on File: [phone]     Best Call Back Number, if different:    Additional Information: Pt called stating he was in rehab and is needing a refill on ALL his medication. Please advise, thank you.